# Patient Record
Sex: FEMALE | Race: BLACK OR AFRICAN AMERICAN | Employment: OTHER | URBAN - METROPOLITAN AREA
[De-identification: names, ages, dates, MRNs, and addresses within clinical notes are randomized per-mention and may not be internally consistent; named-entity substitution may affect disease eponyms.]

---

## 2018-07-09 ENCOUNTER — HOSPITAL ENCOUNTER (OUTPATIENT)
Dept: PHYSICAL THERAPY | Age: 73
Setting detail: THERAPIES SERIES
Discharge: HOME | End: 2018-07-09
Payer: MEDICARE

## 2018-07-09 PROCEDURE — G8979 MOBILITY GOAL STATUS: HCPCS

## 2018-07-09 PROCEDURE — 97161 PT EVAL LOW COMPLEX 20 MIN: CPT

## 2018-07-09 PROCEDURE — G8978 MOBILITY CURRENT STATUS: HCPCS

## 2018-07-09 NOTE — PROGRESS NOTES
modified independent level with increased R and L lateral sway but no actual LOB. Decreased heel strike on RLE. Stair Negotiation:  8 steps L rail modified independent level with reciprocal pattern. Additional Comments:  Pt with FAIR endurance with standing/ ambulation. Summary/Assessment:    Pt presents to outpatient physical therapy with decreased endurance, history of multiple falls, and decreased BLE strength which is affecting her functional mobility. Plan:     Below checked are areas for improvement during physical therapy POC:        []  Pain reduction  [x]  Balance Improvement       [x]  Strengthening  []  Postural Improvement   []  Range of Motion  []  Soft Tissue Improvement    [x]  Gait Training   []  Other:      [x]  Home Exercise Program      Pt will be see for 2 visits per week for 4 weeks for a total of 8 visits to accomplish goals set below:        Short Term/ Long Term Goals: (4 weeks)      1. Pt will report 0/10 BLE pain during ambulation and daily activities    2. Pt will increase RLE strength to at least 4+/5 throughout hip/ knee and LLE to 5/5 throughout     3. Pt will improve endurance from FAIR to GOOD    4. Pt will ambulate >350' with w/w as needed at modified independent level with minimal lateral sway and improved heel strike on R    5. Pt will ascend/ descend 12 steps with L rail at modified independent level with reciprocal gait pattern     6. Pt will be independent with HEP. Pt's potential for reaching Physical Therapy goals: good. Time In:  1400  Time Out:  [address] 112, DPT  [id number]    [facility]  T: [phone]   F: [phone]     If you have any questions or concerns, please don't hesitate to call. Thank you for your referral.    Physician Signature:________________________________Date:__________________  By signing above, therapists plan is approved by physician.  All patients under Medicare Insurance   must be signed by physician.

## 2018-07-12 ENCOUNTER — HOSPITAL ENCOUNTER (OUTPATIENT)
Age: 73
Discharge: HOME | End: 2018-07-14
Payer: MEDICARE

## 2018-07-12 ENCOUNTER — OFFICE VISIT (OUTPATIENT)
Dept: NEUROSURGERY | Age: 73
End: 2018-07-12
Payer: MEDICARE

## 2018-07-12 ENCOUNTER — HOSPITAL ENCOUNTER (OUTPATIENT)
Dept: GENERAL RADIOLOGY | Age: 73
Discharge: HOME | End: 2018-07-14
Payer: MEDICARE

## 2018-07-12 VITALS
HEART RATE: 87 BPM | WEIGHT: 231 LBS | DIASTOLIC BLOOD PRESSURE: 62 MMHG | HEIGHT: 67 IN | SYSTOLIC BLOOD PRESSURE: 137 MMHG | BODY MASS INDEX: 36.26 KG/M2

## 2018-07-12 DIAGNOSIS — M54.5 CHRONIC BILATERAL LOW BACK PAIN, WITH SCIATICA PRESENCE UNSPECIFIED: Primary | ICD-10-CM

## 2018-07-12 DIAGNOSIS — M54.5 CHRONIC BILATERAL LOW BACK PAIN, WITH SCIATICA PRESENCE UNSPECIFIED: ICD-10-CM

## 2018-07-12 DIAGNOSIS — G89.29 CHRONIC BILATERAL LOW BACK PAIN, WITH SCIATICA PRESENCE UNSPECIFIED: ICD-10-CM

## 2018-07-12 DIAGNOSIS — R29.898 BILATERAL LEG WEAKNESS: Primary | ICD-10-CM

## 2018-07-12 DIAGNOSIS — G89.29 CHRONIC BILATERAL LOW BACK PAIN, WITH SCIATICA PRESENCE UNSPECIFIED: Primary | ICD-10-CM

## 2018-07-12 PROCEDURE — 1036F TOBACCO NON-USER: CPT | Performed by: NEUROLOGICAL SURGERY

## 2018-07-12 PROCEDURE — 1090F PRES/ABSN URINE INCON ASSESS: CPT | Performed by: NEUROLOGICAL SURGERY

## 2018-07-12 PROCEDURE — 1123F ACP DISCUSS/DSCN MKR DOCD: CPT | Performed by: NEUROLOGICAL SURGERY

## 2018-07-12 PROCEDURE — 99213 OFFICE O/P EST LOW 20 MIN: CPT | Performed by: NEUROLOGICAL SURGERY

## 2018-07-12 PROCEDURE — 72100 X-RAY EXAM L-S SPINE 2/3 VWS: CPT

## 2018-07-12 PROCEDURE — 1101F PT FALLS ASSESS-DOCD LE1/YR: CPT | Performed by: NEUROLOGICAL SURGERY

## 2018-07-12 PROCEDURE — G8417 CALC BMI ABV UP PARAM F/U: HCPCS | Performed by: NEUROLOGICAL SURGERY

## 2018-07-12 PROCEDURE — G8400 PT W/DXA NO RESULTS DOC: HCPCS | Performed by: NEUROLOGICAL SURGERY

## 2018-07-12 PROCEDURE — G8427 DOCREV CUR MEDS BY ELIG CLIN: HCPCS | Performed by: NEUROLOGICAL SURGERY

## 2018-07-12 PROCEDURE — 4040F PNEUMOC VAC/ADMIN/RCVD: CPT | Performed by: NEUROLOGICAL SURGERY

## 2018-07-12 PROCEDURE — 3017F COLORECTAL CA SCREEN DOC REV: CPT | Performed by: NEUROLOGICAL SURGERY

## 2018-07-12 RX ORDER — TIZANIDINE 2 MG/1
4 TABLET ORAL EVERY 8 HOURS PRN
Qty: 30 TABLET | Refills: 1 | Status: SHIPPED | OUTPATIENT
Start: 2018-07-12 | End: 2019-02-21 | Stop reason: SDUPTHER

## 2018-07-12 NOTE — PROGRESS NOTES
Post Operative Follow-up    Patient is status post: thoracolumbar fusion. She is stable. She still has bilateral leg weakness. She has some spasms. Physical Exam  Alert and Oriented X 3  PERRLA, EOMI  4-/5 in bilateral LE, 5/5 in UE  Wound: C/D/I    A/P: patient is s/p T-L fusion 3 years ago. She still has leg weakness. Recommend PT. Follow up in 6 months and we will try zanaflex.     Denis Doherty

## 2018-07-18 ENCOUNTER — HOSPITAL ENCOUNTER (OUTPATIENT)
Dept: PHYSICAL THERAPY | Age: 73
Setting detail: THERAPIES SERIES
Discharge: HOME | End: 2018-07-18
Payer: MEDICARE

## 2018-07-18 PROCEDURE — 97530 THERAPEUTIC ACTIVITIES: CPT

## 2018-07-18 PROCEDURE — 97110 THERAPEUTIC EXERCISES: CPT

## 2018-07-18 NOTE — PROGRESS NOTES
pending MD visit [] Discharge    Plan for Next Session:    Progress RLE strengthening, endurance exercises, and gait training as tolerated.       See Weekly Progress Note: []  Yes  [x]  No  Next due:        Time In: 1300  Time Out: 6177    Electronically signed by:    Gomez Sethi DPT  [id number]

## 2018-07-20 ENCOUNTER — HOSPITAL ENCOUNTER (OUTPATIENT)
Dept: PHYSICAL THERAPY | Age: 73
Setting detail: THERAPIES SERIES
Discharge: HOME | End: 2018-07-20
Payer: MEDICARE

## 2018-07-20 PROCEDURE — 97530 THERAPEUTIC ACTIVITIES: CPT

## 2018-07-20 PROCEDURE — 97110 THERAPEUTIC EXERCISES: CPT

## 2018-07-25 ENCOUNTER — HOSPITAL ENCOUNTER (OUTPATIENT)
Dept: PHYSICAL THERAPY | Age: 73
Setting detail: THERAPIES SERIES
Discharge: HOME | End: 2018-07-25
Payer: MEDICARE

## 2018-07-25 PROCEDURE — 97110 THERAPEUTIC EXERCISES: CPT

## 2018-07-25 PROCEDURE — 97530 THERAPEUTIC ACTIVITIES: CPT

## 2018-07-25 NOTE — PROGRESS NOTES
[facility]                Phone: [phone]   Fax: [phone]    Physical Therapy Daily Treatment Note  Date:  2018    Patient Name:  Tony Monk    :  1945  MRN: [id number]    Referring Physician:  Sharmila Singleton MD   Insurance Information:  Medicare Part A and B     Evaluation date:  18  Diagnosis:  Gait training, falls  Treatment diagnosis:    Cert Dates:  9/5/10- 10/9/18  ICD-10 Codes:  I63.6  R26.9  M62.838  R29.90  Evaluating Physical Therapist:  Luci Leyden, DPT  Plan of care signed (Y/N):    Visit# / total visits:  3/8       Subjective:   Pt with no complaints at this time. She reports doing well since last visit. Denies muscle soreness/ pain following last session. Exercises:   Exercise/Equipment Reps  During/ after  Other comments   NT Bike        Step one  8 min  Level #3     Standing marches  3x15 In // bars  Increased reps today     Sit to stands  3x15 Raised mat table      Standing Hip 3 way    2x15 RLE  Moderate fatigue in BLE after exercise     Supine SLR  3x8 BLE         Step ups  3x12  4\" step  Up with RLE first                   Ambulation   150' with front swivel w/w both modified independent level  R AFO    NT Stair training           Home Exercise Program:       Comments:  Pt tolerated session well with no complaints of pain. She did report moderate BLE fatigue following session. Improved performance with Supine SLR today.       Treatment/Activity Tolerance:  [x] Patient tolerated treatment well [] Patient limited by fatique  [] Patient limited by pain  [] Patient limited by other medical complications  [] Other:     Prognosis: [x] Good [x] Fair  [] Poor    Patient Requires Follow-up: [x] Yes  [] No    Plan:   [x] Continue per plan of care [] Alter current plan (see comments)  [] Plan of care initiated [] Hold pending MD visit [] Discharge    Plan for Next Session:    Progress RLE strengthening, endurance

## 2018-07-27 ENCOUNTER — HOSPITAL ENCOUNTER (OUTPATIENT)
Dept: PHYSICAL THERAPY | Age: 73
Setting detail: THERAPIES SERIES
Discharge: HOME | End: 2018-07-27
Payer: MEDICARE

## 2018-07-27 NOTE — PROGRESS NOTES
[address] [facility]                Phone: [phone]  Fax: [phone]    Physical Therapy  Cancellation/No-show Note  Patient Name:  Sailaja Howard  :  1945   Date:  2018    For today's appointment patient:  [x]  Cancelled  []  Rescheduled appointment  []  No-show     Reason given by patient:  []  Patient ill  [x]  Conflicting appointment  []  No transportation    []  Conflict with work  []  No reason given  [x]  Other: Pt rescheduled for next week.            Electronically signed by:    Jaylin Bo DPT  [id number]

## 2018-08-01 ENCOUNTER — HOSPITAL ENCOUNTER (OUTPATIENT)
Dept: PHYSICAL THERAPY | Age: 73
Setting detail: THERAPIES SERIES
Discharge: HOME | End: 2018-08-01
Payer: MEDICARE

## 2018-09-20 ENCOUNTER — HOSPITAL ENCOUNTER (OUTPATIENT)
Dept: PHYSICAL THERAPY | Age: 73
Setting detail: THERAPIES SERIES
Discharge: HOME | End: 2018-09-20
Payer: MEDICARE

## 2018-09-20 PROCEDURE — G8979 MOBILITY GOAL STATUS: HCPCS

## 2018-09-20 PROCEDURE — G8980 MOBILITY D/C STATUS: HCPCS

## 2018-10-16 ENCOUNTER — APPOINTMENT (OUTPATIENT)
Dept: CT IMAGING | Age: 73
DRG: 556 | End: 2018-10-16
Payer: MEDICARE

## 2018-10-16 ENCOUNTER — HOSPITAL ENCOUNTER (INPATIENT)
Age: 73
LOS: 5 days | Discharge: NURSING, REHAB OR LONG-TERM CARE | DRG: 556 | End: 2018-10-24
Attending: EMERGENCY MEDICINE | Admitting: INTERNAL MEDICINE
Payer: MEDICARE

## 2018-10-16 DIAGNOSIS — R29.898 WEAKNESS OF BOTH LOWER EXTREMITIES: Primary | ICD-10-CM

## 2018-10-16 LAB
ALBUMIN SERPL-MCNC: 4 G/DL (ref 3.5–5.2)
ALP BLD-CCNC: 68 U/L (ref 35–104)
ALT SERPL-CCNC: 15 U/L (ref 0–32)
ANION GAP SERPL CALCULATED.3IONS-SCNC: 13 MMOL/L (ref 7–16)
AST SERPL-CCNC: 26 U/L (ref 0–31)
BASOPHILS ABSOLUTE: 0.02 E9/L (ref 0–0.2)
BASOPHILS RELATIVE PERCENT: 0.3 % (ref 0–2)
BILIRUB SERPL-MCNC: 0.4 MG/DL (ref 0–1.2)
BUN BLDV-MCNC: 28 MG/DL (ref 8–23)
CALCIUM SERPL-MCNC: 9.2 MG/DL (ref 8.6–10.2)
CHLORIDE BLD-SCNC: 104 MMOL/L (ref 98–107)
CO2: 25 MMOL/L (ref 22–29)
CREAT SERPL-MCNC: 0.8 MG/DL (ref 0.5–1)
EOSINOPHILS ABSOLUTE: 0.06 E9/L (ref 0.05–0.5)
EOSINOPHILS RELATIVE PERCENT: 0.9 % (ref 0–6)
GFR AFRICAN AMERICAN: >60
GFR NON-AFRICAN AMERICAN: >60 ML/MIN/1.73
GLUCOSE BLD-MCNC: 94 MG/DL (ref 74–109)
HCT VFR BLD CALC: 39.5 % (ref 34–48)
HEMOGLOBIN: 12.6 G/DL (ref 11.5–15.5)
IMMATURE GRANULOCYTES #: 0.03 E9/L
IMMATURE GRANULOCYTES %: 0.4 % (ref 0–5)
LYMPHOCYTES ABSOLUTE: 1.75 E9/L (ref 1.5–4)
LYMPHOCYTES RELATIVE PERCENT: 25.7 % (ref 20–42)
MCH RBC QN AUTO: 28 PG (ref 26–35)
MCHC RBC AUTO-ENTMCNC: 31.9 % (ref 32–34.5)
MCV RBC AUTO: 87.8 FL (ref 80–99.9)
MONOCYTES ABSOLUTE: 0.43 E9/L (ref 0.1–0.95)
MONOCYTES RELATIVE PERCENT: 6.3 % (ref 2–12)
NEUTROPHILS ABSOLUTE: 4.52 E9/L (ref 1.8–7.3)
NEUTROPHILS RELATIVE PERCENT: 66.4 % (ref 43–80)
PDW BLD-RTO: 15.5 FL (ref 11.5–15)
PLATELET # BLD: 345 E9/L (ref 130–450)
PMV BLD AUTO: 12.8 FL (ref 7–12)
POTASSIUM SERPL-SCNC: 3.4 MMOL/L (ref 3.5–5)
RBC # BLD: 4.5 E12/L (ref 3.5–5.5)
SODIUM BLD-SCNC: 142 MMOL/L (ref 132–146)
TOTAL PROTEIN: 6.8 G/DL (ref 6.4–8.3)
WBC # BLD: 6.8 E9/L (ref 4.5–11.5)

## 2018-10-16 PROCEDURE — G0378 HOSPITAL OBSERVATION PER HR: HCPCS

## 2018-10-16 PROCEDURE — 36415 COLL VENOUS BLD VENIPUNCTURE: CPT

## 2018-10-16 PROCEDURE — 72131 CT LUMBAR SPINE W/O DYE: CPT

## 2018-10-16 PROCEDURE — 96372 THER/PROPH/DIAG INJ SC/IM: CPT

## 2018-10-16 PROCEDURE — 85025 COMPLETE CBC W/AUTO DIFF WBC: CPT

## 2018-10-16 PROCEDURE — 80053 COMPREHEN METABOLIC PANEL: CPT

## 2018-10-16 PROCEDURE — 2580000003 HC RX 258: Performed by: INTERNAL MEDICINE

## 2018-10-16 PROCEDURE — 99285 EMERGENCY DEPT VISIT HI MDM: CPT

## 2018-10-16 RX ORDER — POTASSIUM CHLORIDE 7.45 MG/ML
10 INJECTION INTRAVENOUS PRN
Status: DISCONTINUED | OUTPATIENT
Start: 2018-10-16 | End: 2018-10-24 | Stop reason: HOSPADM

## 2018-10-16 RX ORDER — HYDROCHLOROTHIAZIDE 25 MG/1
25 TABLET ORAL DAILY
Status: DISCONTINUED | OUTPATIENT
Start: 2018-10-17 | End: 2018-10-24 | Stop reason: HOSPADM

## 2018-10-16 RX ORDER — LISINOPRIL AND HYDROCHLOROTHIAZIDE 25; 20 MG/1; MG/1
1 TABLET ORAL DAILY
Status: DISCONTINUED | OUTPATIENT
Start: 2018-10-17 | End: 2018-10-16 | Stop reason: CLARIF

## 2018-10-16 RX ORDER — ONDANSETRON 2 MG/ML
4 INJECTION INTRAMUSCULAR; INTRAVENOUS EVERY 6 HOURS PRN
Status: DISCONTINUED | OUTPATIENT
Start: 2018-10-16 | End: 2018-10-24 | Stop reason: HOSPADM

## 2018-10-16 RX ORDER — DOCUSATE SODIUM 100 MG/1
200 CAPSULE, LIQUID FILLED ORAL 2 TIMES DAILY
Status: DISCONTINUED | OUTPATIENT
Start: 2018-10-16 | End: 2018-10-24 | Stop reason: HOSPADM

## 2018-10-16 RX ORDER — ALLOPURINOL 300 MG/1
300 TABLET ORAL DAILY
Status: DISCONTINUED | OUTPATIENT
Start: 2018-10-17 | End: 2018-10-24 | Stop reason: HOSPADM

## 2018-10-16 RX ORDER — ASPIRIN 81 MG/1
81 TABLET, CHEWABLE ORAL DAILY
Status: DISCONTINUED | OUTPATIENT
Start: 2018-10-17 | End: 2018-10-22

## 2018-10-16 RX ORDER — POTASSIUM CHLORIDE 20 MEQ/1
40 TABLET, EXTENDED RELEASE ORAL PRN
Status: DISCONTINUED | OUTPATIENT
Start: 2018-10-16 | End: 2018-10-24 | Stop reason: HOSPADM

## 2018-10-16 RX ORDER — SODIUM CHLORIDE 0.9 % (FLUSH) 0.9 %
10 SYRINGE (ML) INJECTION EVERY 12 HOURS SCHEDULED
Status: DISCONTINUED | OUTPATIENT
Start: 2018-10-16 | End: 2018-10-24 | Stop reason: HOSPADM

## 2018-10-16 RX ORDER — SODIUM CHLORIDE 0.9 % (FLUSH) 0.9 %
10 SYRINGE (ML) INJECTION PRN
Status: DISCONTINUED | OUTPATIENT
Start: 2018-10-16 | End: 2018-10-24 | Stop reason: HOSPADM

## 2018-10-16 RX ORDER — CITALOPRAM 20 MG/1
10 TABLET ORAL DAILY
Status: DISCONTINUED | OUTPATIENT
Start: 2018-10-17 | End: 2018-10-24 | Stop reason: HOSPADM

## 2018-10-16 RX ORDER — TIZANIDINE 4 MG/1
4 TABLET ORAL EVERY 8 HOURS PRN
Status: DISCONTINUED | OUTPATIENT
Start: 2018-10-16 | End: 2018-10-24 | Stop reason: HOSPADM

## 2018-10-16 RX ORDER — BACLOFEN 10 MG/1
5 TABLET ORAL NIGHTLY
Status: DISCONTINUED | OUTPATIENT
Start: 2018-10-16 | End: 2018-10-24 | Stop reason: HOSPADM

## 2018-10-16 RX ORDER — OMEPRAZOLE 20 MG/1
1 CAPSULE, DELAYED RELEASE ORAL DAILY
Refills: 0 | COMMUNITY
Start: 2018-08-03

## 2018-10-16 RX ORDER — LISINOPRIL 20 MG/1
20 TABLET ORAL DAILY
Status: DISCONTINUED | OUTPATIENT
Start: 2018-10-17 | End: 2018-10-24 | Stop reason: HOSPADM

## 2018-10-16 RX ORDER — POTASSIUM CHLORIDE 20MEQ/15ML
40 LIQUID (ML) ORAL PRN
Status: DISCONTINUED | OUTPATIENT
Start: 2018-10-16 | End: 2018-10-24 | Stop reason: HOSPADM

## 2018-10-16 RX ORDER — LISINOPRIL AND HYDROCHLOROTHIAZIDE 25; 20 MG/1; MG/1
1 TABLET ORAL DAILY
COMMUNITY
Start: 2018-09-17

## 2018-10-16 RX ADMIN — Medication 10 ML: at 23:02

## 2018-10-16 ASSESSMENT — PAIN SCALES - GENERAL: PAINLEVEL_OUTOF10: 0

## 2018-10-16 NOTE — ED PROVIDER NOTES
26.0 - 35.0 pg    MCHC 31.9 (L) 32.0 - 34.5 %    RDW 15.5 (H) 11.5 - 15.0 fL    Platelets 907 070 - 667 E9/L    MPV 12.8 (H) 7.0 - 12.0 fL    Neutrophils % 66.4 43.0 - 80.0 %    Immature Granulocytes % 0.4 0.0 - 5.0 %    Lymphocytes % 25.7 20.0 - 42.0 %    Monocytes % 6.3 2.0 - 12.0 %    Eosinophils % 0.9 0.0 - 6.0 %    Basophils % 0.3 0.0 - 2.0 %    Neutrophils # 4.52 1.80 - 7.30 E9/L    Immature Granulocytes # 0.03 E9/L    Lymphocytes # 1.75 1.50 - 4.00 E9/L    Monocytes # 0.43 0.10 - 0.95 E9/L    Eosinophils # 0.06 0.05 - 0.50 E9/L    Basophils # 0.02 0.00 - 0.20 E9/L   Comprehensive Metabolic Panel   Result Value Ref Range    Sodium 142 132 - 146 mmol/L    Potassium 3.4 (L) 3.5 - 5.0 mmol/L    Chloride 104 98 - 107 mmol/L    CO2 25 22 - 29 mmol/L    Anion Gap 13 7 - 16 mmol/L    Glucose 94 74 - 109 mg/dL    BUN 28 (H) 8 - 23 mg/dL    CREATININE 0.8 0.5 - 1.0 mg/dL    GFR Non-African American >60 >=60 mL/min/1.73    GFR African American >60     Calcium 9.2 8.6 - 10.2 mg/dL    Total Protein 6.8 6.4 - 8.3 g/dL    Alb 4.0 3.5 - 5.2 g/dL    Total Bilirubin 0.4 0.0 - 1.2 mg/dL    Alkaline Phosphatase 68 35 - 104 U/L    ALT 15 0 - 32 U/L    AST 26 0 - 31 U/L       RADIOLOGY:  Interpreted by Radiologist.  CT Lumbar Spine WO Contrast   Final Result   1. Extensive surgery and fusion of the lumbar spine as commented. 2. Considering de difficult to evaluate the fine detail due to the   streak artifacts of the orthopedic hardware cannot conspicuously   identified a new acute fracture or dislocation in the lower   thoracic/lumbar/upper sacral spine. 3. Old misalignments at L4-5 and L5-S1 stabilized by diffusion.                      ------------------------- NURSING NOTES AND VITALS REVIEWED ---------------------------   The nursing notes within the ED encounter and vital signs as below have been reviewed by myself.   /70   Pulse 91   Temp 97.8 °F (36.6 °C)   Resp 18   Ht 5' 7\" (1.702 m)   Wt 230 lb

## 2018-10-17 LAB
AMORPHOUS: ABNORMAL
BACTERIA: ABNORMAL /HPF
BASOPHILS ABSOLUTE: 0.02 E9/L (ref 0–0.2)
BASOPHILS RELATIVE PERCENT: 0.3 % (ref 0–2)
BILIRUBIN URINE: NEGATIVE
BLOOD, URINE: NEGATIVE
C-REACTIVE PROTEIN: 0.8 MG/DL (ref 0–0.4)
CLARITY: NORMAL
COLOR: YELLOW
EOSINOPHILS ABSOLUTE: 0.13 E9/L (ref 0.05–0.5)
EOSINOPHILS RELATIVE PERCENT: 2 % (ref 0–6)
EPITHELIAL CELLS, UA: ABNORMAL /HPF
GLUCOSE URINE: NEGATIVE MG/DL
HCT VFR BLD CALC: 37.6 % (ref 34–48)
HEMOGLOBIN: 11.6 G/DL (ref 11.5–15.5)
IMMATURE GRANULOCYTES #: 0.02 E9/L
IMMATURE GRANULOCYTES %: 0.3 % (ref 0–5)
KETONES, URINE: NEGATIVE MG/DL
LEUKOCYTE ESTERASE, URINE: NEGATIVE
LYMPHOCYTES ABSOLUTE: 2.38 E9/L (ref 1.5–4)
LYMPHOCYTES RELATIVE PERCENT: 36.5 % (ref 20–42)
MCH RBC QN AUTO: 27.3 PG (ref 26–35)
MCHC RBC AUTO-ENTMCNC: 30.9 % (ref 32–34.5)
MCV RBC AUTO: 88.5 FL (ref 80–99.9)
MONOCYTES ABSOLUTE: 0.53 E9/L (ref 0.1–0.95)
MONOCYTES RELATIVE PERCENT: 8.1 % (ref 2–12)
NEUTROPHILS ABSOLUTE: 3.44 E9/L (ref 1.8–7.3)
NEUTROPHILS RELATIVE PERCENT: 52.8 % (ref 43–80)
NITRITE, URINE: NEGATIVE
PDW BLD-RTO: 15.1 FL (ref 11.5–15)
PH UA: 7 (ref 5–9)
PLATELET # BLD: 265 E9/L (ref 130–450)
PMV BLD AUTO: 11.5 FL (ref 7–12)
PROTEIN UA: NEGATIVE MG/DL
RBC # BLD: 4.25 E12/L (ref 3.5–5.5)
RBC UA: ABNORMAL /HPF (ref 0–2)
SEDIMENTATION RATE, ERYTHROCYTE: 12 MM/HR (ref 0–20)
SPECIFIC GRAVITY UA: 1.01 (ref 1–1.03)
TOTAL CK: 317 U/L (ref 20–180)
UROBILINOGEN, URINE: 1 E.U./DL
WBC # BLD: 6.5 E9/L (ref 4.5–11.5)
WBC UA: ABNORMAL /HPF (ref 0–5)

## 2018-10-17 PROCEDURE — G8979 MOBILITY GOAL STATUS: HCPCS

## 2018-10-17 PROCEDURE — 6370000000 HC RX 637 (ALT 250 FOR IP): Performed by: INTERNAL MEDICINE

## 2018-10-17 PROCEDURE — 97161 PT EVAL LOW COMPLEX 20 MIN: CPT

## 2018-10-17 PROCEDURE — 81001 URINALYSIS AUTO W/SCOPE: CPT

## 2018-10-17 PROCEDURE — 97530 THERAPEUTIC ACTIVITIES: CPT

## 2018-10-17 PROCEDURE — 36415 COLL VENOUS BLD VENIPUNCTURE: CPT

## 2018-10-17 PROCEDURE — 2580000003 HC RX 258: Performed by: INTERNAL MEDICINE

## 2018-10-17 PROCEDURE — 86140 C-REACTIVE PROTEIN: CPT

## 2018-10-17 PROCEDURE — 6360000002 HC RX W HCPCS: Performed by: INTERNAL MEDICINE

## 2018-10-17 PROCEDURE — G8987 SELF CARE CURRENT STATUS: HCPCS

## 2018-10-17 PROCEDURE — G8978 MOBILITY CURRENT STATUS: HCPCS

## 2018-10-17 PROCEDURE — G8988 SELF CARE GOAL STATUS: HCPCS

## 2018-10-17 PROCEDURE — G0378 HOSPITAL OBSERVATION PER HR: HCPCS

## 2018-10-17 PROCEDURE — 97165 OT EVAL LOW COMPLEX 30 MIN: CPT

## 2018-10-17 PROCEDURE — 99222 1ST HOSP IP/OBS MODERATE 55: CPT | Performed by: NEUROLOGICAL SURGERY

## 2018-10-17 PROCEDURE — 85025 COMPLETE CBC W/AUTO DIFF WBC: CPT

## 2018-10-17 PROCEDURE — 96372 THER/PROPH/DIAG INJ SC/IM: CPT

## 2018-10-17 PROCEDURE — 82550 ASSAY OF CK (CPK): CPT

## 2018-10-17 PROCEDURE — 85651 RBC SED RATE NONAUTOMATED: CPT

## 2018-10-17 RX ADMIN — DOCUSATE SODIUM 200 MG: 100 CAPSULE, LIQUID FILLED ORAL at 20:01

## 2018-10-17 RX ADMIN — Medication 10 ML: at 10:50

## 2018-10-17 RX ADMIN — MAGNESIUM HYDROXIDE 30 ML: 400 SUSPENSION ORAL at 16:44

## 2018-10-17 RX ADMIN — ENOXAPARIN SODIUM 40 MG: 40 INJECTION SUBCUTANEOUS at 10:51

## 2018-10-17 RX ADMIN — CITALOPRAM 10 MG: 20 TABLET, FILM COATED ORAL at 10:49

## 2018-10-17 RX ADMIN — Medication 10 ML: at 20:01

## 2018-10-17 RX ADMIN — DOCUSATE SODIUM 200 MG: 100 CAPSULE, LIQUID FILLED ORAL at 10:51

## 2018-10-17 RX ADMIN — ASPIRIN 81 MG CHEWABLE TABLET 81 MG: 81 TABLET CHEWABLE at 10:49

## 2018-10-17 RX ADMIN — POTASSIUM CHLORIDE 40 MEQ: 20 TABLET, EXTENDED RELEASE ORAL at 10:48

## 2018-10-17 RX ADMIN — LISINOPRIL 20 MG: 20 TABLET ORAL at 10:50

## 2018-10-17 RX ADMIN — BACLOFEN 5 MG: 10 TABLET ORAL at 20:00

## 2018-10-17 RX ADMIN — HYDROCHLOROTHIAZIDE 25 MG: 25 TABLET ORAL at 10:49

## 2018-10-17 RX ADMIN — ALLOPURINOL 300 MG: 300 TABLET ORAL at 10:50

## 2018-10-17 ASSESSMENT — PAIN DESCRIPTION - PAIN TYPE: TYPE: CHRONIC PAIN

## 2018-10-17 ASSESSMENT — PAIN DESCRIPTION - FREQUENCY: FREQUENCY: INTERMITTENT

## 2018-10-17 ASSESSMENT — PAIN SCALES - GENERAL
PAINLEVEL_OUTOF10: 4
PAINLEVEL_OUTOF10: 0

## 2018-10-17 ASSESSMENT — PAIN DESCRIPTION - LOCATION: LOCATION: SHOULDER

## 2018-10-17 ASSESSMENT — PAIN DESCRIPTION - ORIENTATION: ORIENTATION: RIGHT

## 2018-10-17 ASSESSMENT — PAIN DESCRIPTION - DESCRIPTORS: DESCRIPTORS: DISCOMFORT;ACHING;TENDER

## 2018-10-17 ASSESSMENT — PAIN DESCRIPTION - ONSET: ONSET: PROGRESSIVE

## 2018-10-17 NOTE — PLAN OF CARE
Problem: Falls - Risk of:  Goal: Absence of physical injury  Absence of physical injury   Outcome: Met This Shift      Problem: Pain:  Goal: Control of acute pain  Control of acute pain   Outcome: Ongoing
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
## 2018-10-17 NOTE — H&P
Department of Internal Medicine  General Internal Medicine  Simeon Johnson M.D. History and Physical      CHIEF COMPLAINT:  Weakness in legs b/ l     Reason for Admission:  As above /possible placement     History Obtained From:  patient     HISTORY OF PRESENT ILLNESS:      The patient is a 72 y.o. female of Araceli Wolf MD with significant past medical history of back surgery remotely  who presents with complaints of lower extremity weakness. sHe tells me she went to the bathroom and wanted to squat down to do a bit of activity. She was unable to get back up. She scooted herself to  An area where she was able to lift herself up using handrails. She felt ok and was able to ambulate using her walker. Next morning , she was again so weak that she was not able to get  Out of bed. No bowel bladder incontinence, no alarm symptoms.  ,      Past Medical History:        Diagnosis Date    Anterolisthesis     Back pain 1/11/2013    Bulging lumbar disc     DDD (degenerative disc disease)     Facet arthropathy, lumbar     Herniated lumbar intervertebral disc     History of blood transfusion     Hypertension     Leg pain, bilateral     Neural foraminal stenosis of lumbar spine     Osteoarthritis     Pinched vertebral nerve     Sciatic pain     Spinal stenosis of lumbar region      Past Surgical History:        Procedure Laterality Date    BACK SURGERY      BREAST SURGERY      COLONOSCOPY      LUMBAR FUSION      LUMBAR LAMINECTOMY  04/29/2013    and fusion lumbar1-lumbar4    SPINE SURGERY      x 3    THORACIC LAMINECTOMY  04/29/2015    T9-12    TONSILLECTOMY           Medications Prior to Admission:    Prescriptions Prior to Admission: lisinopril-hydrochlorothiazide (PRINZIDE;ZESTORETIC) 20-25 MG per tablet, Take 1 tablet by mouth daily  omeprazole (PRILOSEC) 20 MG delayed release capsule, Take 1 capsule by mouth daily  tiZANidine (ZANAFLEX) 2 MG tablet, Take 2 tablets by mouth every 8 hours as needed (muscle spasms)  baclofen (LIORESAL) 10 MG tablet, Take 0.5 tablets by mouth nightly (Patient taking differently: Take 5 mg by mouth nightly as needed )  citalopram (CELEXA) 10 MG tablet, Take 1 tablet by mouth daily  Cyanocobalamin (VITAMIN B-12) 50 MCG tablet, Take 1 tablet by mouth daily  docusate sodium (COLACE, DULCOLAX) 100 MG CAPS, Take 200 mg by mouth 2 times daily (Patient taking differently: Take 200 mg by mouth 2 times daily as needed )  aspirin 81 MG chewable tablet, Take 81 mg by mouth daily  allopurinol (ZYLOPRIM) 300 MG tablet, Take 300 mg by mouth daily. Multiple Vitamins-Minerals (CENTRUM SILVER) TABS, Take 1 tablet by mouth daily. Allergies:  Patient has no known allergies. Social History:   TOBACCO:   reports that she has never smoked. She has never used smokeless tobacco.  ETOH:   reports that she does not drink alcohol. MARITAL STATUS:    OCCUPATION:      Family History:   Family History   Problem Relation Age of Onset    Diabetes Mother     Diabetes Sister    Bill Perks Glaucoma Sister     Hypertension Brother     Glaucoma Brother        REVIEW OF SYSTEMS:    General ROS: weakness in legs   Hematological and Lymphatic ROS: negative  Endocrine ROS: negative  Respiratory ROS: no cough, shortness of breath, or wheezing  Cardiovascular ROS: no chest pain or dyspnea on exertion  Gastrointestinal ROS: no abdominal pain, change in bowel habits, or black or bloody stools  Genito-Urinary ROS: no dysuria, trouble voiding, or hematuria  Neurological ROS: no TIA or stroke symptoms  negative    Vitals:  BP (!) 170/70   Pulse 76   Temp 97.6 °F (36.4 °C) (Temporal)   Resp 16   Ht 5' 7\" (1.702 m)   Wt 230 lb (104.3 kg)   SpO2 97%   BMI 36.02 kg/m²     PHYSICAL EXAM:  General:  Awake, alert, oriented X 3. Well developed, well nourished, well groomed. No apparent distress. HEENT:  Normocephalic, atraumatic. Pupils equal, round, reactive to light. No scleral icterus. No conjunctival injection.   Normal lips, teeth, and gums. No nasal discharge. Neck:  Supple  Heart:  RRR, no murmurs, gallops, rubs, carotid upstroke normal, no carotid bruits  Lungs:  CTA bilaterally, bilat symmetrical expansion, no wheeze, rales, or rhonchi  Abdomen: Bowel sounds present, soft, nontender, no masses, no organomegaly, no peritoneal signs  Extremities:  Right shoulder weakness, b/l lower extremity dorsiflexion diminished   Skin:  Warm and dry, no open lesions or rash  Neuro:  Cranial nerves 2-12 intact, no focal deficits  Breast: deferred  Rectal: deferred  Genitalia:  deferred      DATA:     Recent Labs      10/16/18   1815  10/17/18   0448   WBC  6.8  6.5   HGB  12.6  11.6   PLT  345  265     Recent Labs      10/16/18   1706   NA  142   K  3.4*   BUN  28*   CREATININE  0.8     Recent Labs      10/16/18   1706   PROT  6.8     Recent Labs      10/16/18   1706   AST  26   ALT  15   ALKPHOS  68   BILITOT  0.4     No results for input(s): BNP in the last 72 hours. No results for input(s): CKTOTAL, CKMB, CKMBINDEX, TROPONINI in the last 72 hours. ASSESSMENT:      Principal Problem:    Weakness of both lower extremities  Resolved Problems:    * No resolved hospital problems.  *        PLAN:    Admitted to [facility] evaluation of b/l lower extremtity weakness sudden onset  Trial steroids , check CK total , check ESR /CRP   Aggressive PT/Ot if able   She would like to go to Acute rehab - not sure if she will be accepted   Right shoulder xray     DVT proph  Pt/OT  Dc plan    Rohan Garcia MD  10/17/2018  12:02 PM

## 2018-10-18 ENCOUNTER — APPOINTMENT (OUTPATIENT)
Dept: GENERAL RADIOLOGY | Age: 73
DRG: 556 | End: 2018-10-18
Payer: MEDICARE

## 2018-10-18 LAB
ANION GAP SERPL CALCULATED.3IONS-SCNC: 14 MMOL/L (ref 7–16)
BASOPHILS ABSOLUTE: 0.03 E9/L (ref 0–0.2)
BASOPHILS RELATIVE PERCENT: 0.5 % (ref 0–2)
BUN BLDV-MCNC: 19 MG/DL (ref 8–23)
CALCIUM SERPL-MCNC: 9.2 MG/DL (ref 8.6–10.2)
CHLORIDE BLD-SCNC: 104 MMOL/L (ref 98–107)
CO2: 24 MMOL/L (ref 22–29)
CREAT SERPL-MCNC: 0.7 MG/DL (ref 0.5–1)
EOSINOPHILS ABSOLUTE: 0.12 E9/L (ref 0.05–0.5)
EOSINOPHILS RELATIVE PERCENT: 1.9 % (ref 0–6)
GFR AFRICAN AMERICAN: >60
GFR NON-AFRICAN AMERICAN: >60 ML/MIN/1.73
GLUCOSE BLD-MCNC: 102 MG/DL (ref 74–109)
HCT VFR BLD CALC: 36.7 % (ref 34–48)
HEMOGLOBIN: 11.4 G/DL (ref 11.5–15.5)
IMMATURE GRANULOCYTES #: 0.03 E9/L
IMMATURE GRANULOCYTES %: 0.5 % (ref 0–5)
LYMPHOCYTES ABSOLUTE: 2.32 E9/L (ref 1.5–4)
LYMPHOCYTES RELATIVE PERCENT: 36.5 % (ref 20–42)
MCH RBC QN AUTO: 27.4 PG (ref 26–35)
MCHC RBC AUTO-ENTMCNC: 31.1 % (ref 32–34.5)
MCV RBC AUTO: 88.2 FL (ref 80–99.9)
MONOCYTES ABSOLUTE: 0.5 E9/L (ref 0.1–0.95)
MONOCYTES RELATIVE PERCENT: 7.9 % (ref 2–12)
NEUTROPHILS ABSOLUTE: 3.36 E9/L (ref 1.8–7.3)
NEUTROPHILS RELATIVE PERCENT: 52.7 % (ref 43–80)
PDW BLD-RTO: 15.3 FL (ref 11.5–15)
PLATELET # BLD: 283 E9/L (ref 130–450)
PMV BLD AUTO: 12.1 FL (ref 7–12)
POTASSIUM SERPL-SCNC: 4.4 MMOL/L (ref 3.5–5)
RBC # BLD: 4.16 E12/L (ref 3.5–5.5)
SODIUM BLD-SCNC: 142 MMOL/L (ref 132–146)
WBC # BLD: 6.4 E9/L (ref 4.5–11.5)

## 2018-10-18 PROCEDURE — G0378 HOSPITAL OBSERVATION PER HR: HCPCS

## 2018-10-18 PROCEDURE — 36415 COLL VENOUS BLD VENIPUNCTURE: CPT

## 2018-10-18 PROCEDURE — 73030 X-RAY EXAM OF SHOULDER: CPT

## 2018-10-18 PROCEDURE — 99221 1ST HOSP IP/OBS SF/LOW 40: CPT | Performed by: PSYCHIATRY & NEUROLOGY

## 2018-10-18 PROCEDURE — 6360000002 HC RX W HCPCS: Performed by: INTERNAL MEDICINE

## 2018-10-18 PROCEDURE — 85025 COMPLETE CBC W/AUTO DIFF WBC: CPT

## 2018-10-18 PROCEDURE — 96372 THER/PROPH/DIAG INJ SC/IM: CPT

## 2018-10-18 PROCEDURE — 6370000000 HC RX 637 (ALT 250 FOR IP): Performed by: INTERNAL MEDICINE

## 2018-10-18 PROCEDURE — 2580000003 HC RX 258: Performed by: INTERNAL MEDICINE

## 2018-10-18 PROCEDURE — 80048 BASIC METABOLIC PNL TOTAL CA: CPT

## 2018-10-18 RX ORDER — ACETAMINOPHEN 325 MG/1
650 TABLET ORAL EVERY 4 HOURS PRN
Status: DISCONTINUED | OUTPATIENT
Start: 2018-10-18 | End: 2018-10-24 | Stop reason: HOSPADM

## 2018-10-18 RX ORDER — HYDROCODONE BITARTRATE AND ACETAMINOPHEN 5; 325 MG/1; MG/1
1 TABLET ORAL EVERY 6 HOURS PRN
Status: DISCONTINUED | OUTPATIENT
Start: 2018-10-18 | End: 2018-10-24 | Stop reason: HOSPADM

## 2018-10-18 RX ADMIN — HYDROCODONE BITARTRATE AND ACETAMINOPHEN 1 TABLET: 5; 325 TABLET ORAL at 17:39

## 2018-10-18 RX ADMIN — Medication 10 ML: at 10:11

## 2018-10-18 RX ADMIN — HYDROCHLOROTHIAZIDE 25 MG: 25 TABLET ORAL at 10:04

## 2018-10-18 RX ADMIN — ALLOPURINOL 300 MG: 300 TABLET ORAL at 10:02

## 2018-10-18 RX ADMIN — ENOXAPARIN SODIUM 40 MG: 40 INJECTION SUBCUTANEOUS at 09:59

## 2018-10-18 RX ADMIN — TIZANIDINE 4 MG: 4 TABLET ORAL at 00:57

## 2018-10-18 RX ADMIN — BACLOFEN 5 MG: 10 TABLET ORAL at 23:11

## 2018-10-18 RX ADMIN — DOCUSATE SODIUM 200 MG: 100 CAPSULE, LIQUID FILLED ORAL at 09:59

## 2018-10-18 RX ADMIN — DOCUSATE SODIUM 200 MG: 100 CAPSULE, LIQUID FILLED ORAL at 23:12

## 2018-10-18 RX ADMIN — LISINOPRIL 20 MG: 20 TABLET ORAL at 10:04

## 2018-10-18 RX ADMIN — CITALOPRAM 10 MG: 20 TABLET, FILM COATED ORAL at 10:02

## 2018-10-18 RX ADMIN — HYDROCODONE BITARTRATE AND ACETAMINOPHEN 1 TABLET: 5; 325 TABLET ORAL at 23:12

## 2018-10-18 RX ADMIN — ASPIRIN 81 MG CHEWABLE TABLET 81 MG: 81 TABLET CHEWABLE at 10:02

## 2018-10-18 RX ADMIN — Medication 10 ML: at 23:18

## 2018-10-18 ASSESSMENT — PAIN SCALES - GENERAL
PAINLEVEL_OUTOF10: 6
PAINLEVEL_OUTOF10: 0
PAINLEVEL_OUTOF10: 6
PAINLEVEL_OUTOF10: 3
PAINLEVEL_OUTOF10: 0
PAINLEVEL_OUTOF10: 0

## 2018-10-18 NOTE — CONSULTS
nightly  Patient taking differently: Take 5 mg by mouth nightly as needed  11/30/15  Yes Omar Che MD   citalopram (CELEXA) 10 MG tablet Take 1 tablet by mouth daily 6/5/15  Yes Omar Che MD   Cyanocobalamin (VITAMIN B-12) 50 MCG tablet Take 1 tablet by mouth daily 6/5/15  Yes Omar Che MD   docusate sodium (COLACE, DULCOLAX) 100 MG CAPS Take 200 mg by mouth 2 times daily  Patient taking differently: Take 200 mg by mouth 2 times daily as needed  6/5/15  Yes Omar Che MD   aspirin 81 MG chewable tablet Take 81 mg by mouth daily   Yes Historical Provider, MD   allopurinol (ZYLOPRIM) 300 MG tablet Take 300 mg by mouth daily. Yes Historical Provider, MD   Multiple Vitamins-Minerals (CENTRUM SILVER) TABS Take 1 tablet by mouth daily. Yes Historical Provider, MD       Allergies:  Patient has no known allergies. Family History   Problem Relation Age of Onset    Diabetes Mother     Diabetes Sister     Glaucoma Sister     Hypertension Brother     Glaucoma Brother        Social History   Substance Use Topics    Smoking status: Never Smoker    Smokeless tobacco: Never Used    Alcohol use No          ROS:  14 points review of sytem were checked and were neg except as above      EXAMINATION:  BP (!) 161/71   Pulse 80   Temp 98 °F (36.7 °C) (Temporal)   Resp 18   Ht 5' 7\" (1.702 m)   Wt 230 lb (104.3 kg)   SpO2 95%   BMI 36.02 kg/m²     AAOx3, follows commands, no aphasia/dysarthria. PERRL, EOMI, VFF, normal saccades and pursuit, no gaze preference/nystagmus. Intact facial sensation, no asymmetry. Intact hearing bilaterally. Tongue midline. Symmetric palate elevation. Neck muscles and shoulder shrug 5/5. Sensation: intact all over to LT, no neglect. Motor: 5/5 in upper extremities. Normal bulk and tone, No drift/orbiting. In lower extremities there is a component of effort related weakness. It is 1-2/5. The tone is high.   DTRs: +2 biceps/triceps/BR/Knees, 0 ankles, plantars UP B/L. Coordination: intact F-N. No dysmetria. Intact CHRISTEL.      ASSESSMENT:  Subacute weakness in both legs. Plantars are up B/L. PLAN:   Cervical and thoracic and lumbar MRI all without Giorgi. Will follow up with results. -----------------------------------------------------  All questions were answered to the patient's/family's satisfaction when they are available. I personally reviewed the history, exam findings, labs, imagings and other diagnostic tests/conveyed these findings and my assessment of these aforementioned items and also treatment plan, risks/benefits of treatment recommended and prognosis/goals of treatment to patient/family/staff. I spent 55 minutes personally with the patient/family/staff/resident(s) in examination, chart and imaging review, discussing natural history and prognosis, differential diagnosis, risks and benefits of treatment and instructions of which more than 50% of the time was spent for counseling and coordinating care.         Electronically signed by Andre Oppenheim, MD on 10/18/2018 at 1:47 PM

## 2018-10-19 ENCOUNTER — APPOINTMENT (OUTPATIENT)
Dept: MRI IMAGING | Age: 73
DRG: 556 | End: 2018-10-19
Payer: MEDICARE

## 2018-10-19 PROBLEM — R53.81 DEBILITY: Status: ACTIVE | Noted: 2018-10-19

## 2018-10-19 PROCEDURE — 2580000003 HC RX 258: Performed by: INTERNAL MEDICINE

## 2018-10-19 PROCEDURE — 6370000000 HC RX 637 (ALT 250 FOR IP): Performed by: INTERNAL MEDICINE

## 2018-10-19 PROCEDURE — 99232 SBSQ HOSP IP/OBS MODERATE 35: CPT | Performed by: NEUROLOGICAL SURGERY

## 2018-10-19 PROCEDURE — G0378 HOSPITAL OBSERVATION PER HR: HCPCS

## 2018-10-19 PROCEDURE — 6360000002 HC RX W HCPCS: Performed by: INTERNAL MEDICINE

## 2018-10-19 PROCEDURE — 99232 SBSQ HOSP IP/OBS MODERATE 35: CPT | Performed by: NURSE PRACTITIONER

## 2018-10-19 PROCEDURE — 1200000000 HC SEMI PRIVATE

## 2018-10-19 RX ORDER — LORAZEPAM 2 MG/ML
0.5 INJECTION INTRAMUSCULAR
Status: ACTIVE | OUTPATIENT
Start: 2018-10-19 | End: 2018-10-19

## 2018-10-19 RX ORDER — LORAZEPAM 0.5 MG/1
0.5 TABLET ORAL
Status: ACTIVE | OUTPATIENT
Start: 2018-10-19 | End: 2018-10-19

## 2018-10-19 RX ADMIN — Medication 10 ML: at 21:04

## 2018-10-19 RX ADMIN — HYDROCODONE BITARTRATE AND ACETAMINOPHEN 1 TABLET: 5; 325 TABLET ORAL at 09:30

## 2018-10-19 RX ADMIN — CITALOPRAM 10 MG: 20 TABLET, FILM COATED ORAL at 09:23

## 2018-10-19 RX ADMIN — ALLOPURINOL 300 MG: 300 TABLET ORAL at 09:23

## 2018-10-19 RX ADMIN — HYDROCHLOROTHIAZIDE 25 MG: 25 TABLET ORAL at 09:22

## 2018-10-19 RX ADMIN — DOCUSATE SODIUM 200 MG: 100 CAPSULE, LIQUID FILLED ORAL at 21:03

## 2018-10-19 RX ADMIN — HYDROCODONE BITARTRATE AND ACETAMINOPHEN 1 TABLET: 5; 325 TABLET ORAL at 21:06

## 2018-10-19 RX ADMIN — ASPIRIN 81 MG CHEWABLE TABLET 81 MG: 81 TABLET CHEWABLE at 09:23

## 2018-10-19 RX ADMIN — TIZANIDINE 4 MG: 4 TABLET ORAL at 21:03

## 2018-10-19 RX ADMIN — LISINOPRIL 20 MG: 20 TABLET ORAL at 09:23

## 2018-10-19 RX ADMIN — DOCUSATE SODIUM 200 MG: 100 CAPSULE, LIQUID FILLED ORAL at 09:22

## 2018-10-19 RX ADMIN — ENOXAPARIN SODIUM 40 MG: 40 INJECTION SUBCUTANEOUS at 09:24

## 2018-10-19 RX ADMIN — BACLOFEN 5 MG: 10 TABLET ORAL at 21:03

## 2018-10-19 RX ADMIN — Medication 10 ML: at 09:25

## 2018-10-19 ASSESSMENT — PAIN SCALES - GENERAL
PAINLEVEL_OUTOF10: 7
PAINLEVEL_OUTOF10: 3
PAINLEVEL_OUTOF10: 0
PAINLEVEL_OUTOF10: 6
PAINLEVEL_OUTOF10: 0
PAINLEVEL_OUTOF10: 7
PAINLEVEL_OUTOF10: 0
PAINLEVEL_OUTOF10: 5

## 2018-10-19 ASSESSMENT — PAIN DESCRIPTION - LOCATION
LOCATION: SHOULDER
LOCATION: SHOULDER

## 2018-10-19 ASSESSMENT — PAIN DESCRIPTION - PAIN TYPE
TYPE: CHRONIC PAIN
TYPE: CHRONIC PAIN

## 2018-10-19 ASSESSMENT — PAIN DESCRIPTION - ORIENTATION
ORIENTATION: RIGHT
ORIENTATION: RIGHT

## 2018-10-19 ASSESSMENT — PAIN DESCRIPTION - FREQUENCY
FREQUENCY: CONTINUOUS
FREQUENCY: CONTINUOUS

## 2018-10-19 ASSESSMENT — PAIN DESCRIPTION - DESCRIPTORS
DESCRIPTORS: ACHING;CONSTANT;DISCOMFORT
DESCRIPTORS: ACHING;CONSTANT;DISCOMFORT

## 2018-10-19 NOTE — CARE COORDINATION
FRANNIE Discharge planning:    FRANNIE met with patient and patients family. Patient stated that she would like to go [facility] Patient to have MRI and then pt/ot eval. FRANNIE will follow.  Linda Noonan

## 2018-10-19 NOTE — FLOWSHEET NOTE
MRI notified that patient has PRN medication ordered prior to next MRI attempt. MRI to notify staff when they are sending for patient.  Juan Green  10/19/2018  12:37 PM

## 2018-10-20 PROCEDURE — 6370000000 HC RX 637 (ALT 250 FOR IP): Performed by: INTERNAL MEDICINE

## 2018-10-20 PROCEDURE — 99231 SBSQ HOSP IP/OBS SF/LOW 25: CPT | Performed by: PSYCHIATRY & NEUROLOGY

## 2018-10-20 PROCEDURE — 1200000000 HC SEMI PRIVATE

## 2018-10-20 PROCEDURE — 2580000003 HC RX 258: Performed by: INTERNAL MEDICINE

## 2018-10-20 PROCEDURE — 6360000002 HC RX W HCPCS: Performed by: INTERNAL MEDICINE

## 2018-10-20 RX ORDER — LORAZEPAM 0.5 MG/1
0.5 TABLET ORAL
Status: COMPLETED | OUTPATIENT
Start: 2018-10-20 | End: 2018-10-20

## 2018-10-20 RX ORDER — IPRATROPIUM BROMIDE AND ALBUTEROL SULFATE 2.5; .5 MG/3ML; MG/3ML
1 SOLUTION RESPIRATORY (INHALATION)
Status: DISCONTINUED | OUTPATIENT
Start: 2018-10-20 | End: 2018-10-20

## 2018-10-20 RX ADMIN — LORAZEPAM 0.5 MG: 0.5 TABLET ORAL at 10:11

## 2018-10-20 RX ADMIN — DOCUSATE SODIUM 200 MG: 100 CAPSULE, LIQUID FILLED ORAL at 09:25

## 2018-10-20 RX ADMIN — LISINOPRIL 20 MG: 20 TABLET ORAL at 09:25

## 2018-10-20 RX ADMIN — CITALOPRAM 10 MG: 20 TABLET, FILM COATED ORAL at 09:25

## 2018-10-20 RX ADMIN — ALLOPURINOL 300 MG: 300 TABLET ORAL at 09:25

## 2018-10-20 RX ADMIN — ASPIRIN 81 MG CHEWABLE TABLET 81 MG: 81 TABLET CHEWABLE at 09:25

## 2018-10-20 RX ADMIN — Medication 10 ML: at 10:12

## 2018-10-20 RX ADMIN — DOCUSATE SODIUM 200 MG: 100 CAPSULE, LIQUID FILLED ORAL at 20:33

## 2018-10-20 RX ADMIN — BACLOFEN 5 MG: 10 TABLET ORAL at 20:33

## 2018-10-20 RX ADMIN — ENOXAPARIN SODIUM 40 MG: 40 INJECTION SUBCUTANEOUS at 09:26

## 2018-10-20 RX ADMIN — MAGNESIUM HYDROXIDE 30 ML: 400 SUSPENSION ORAL at 19:10

## 2018-10-20 RX ADMIN — HYDROCHLOROTHIAZIDE 25 MG: 25 TABLET ORAL at 09:25

## 2018-10-20 RX ADMIN — HYDROCODONE BITARTRATE AND ACETAMINOPHEN 1 TABLET: 5; 325 TABLET ORAL at 09:34

## 2018-10-20 ASSESSMENT — PAIN DESCRIPTION - PAIN TYPE: TYPE: CHRONIC PAIN

## 2018-10-20 ASSESSMENT — PAIN SCALES - GENERAL
PAINLEVEL_OUTOF10: 5
PAINLEVEL_OUTOF10: 9
PAINLEVEL_OUTOF10: 2

## 2018-10-20 ASSESSMENT — PAIN DESCRIPTION - DESCRIPTORS: DESCRIPTORS: CRAMPING

## 2018-10-20 ASSESSMENT — PAIN DESCRIPTION - ORIENTATION: ORIENTATION: RIGHT;LEFT

## 2018-10-20 ASSESSMENT — PAIN DESCRIPTION - LOCATION: LOCATION: BACK;LEG

## 2018-10-21 ENCOUNTER — APPOINTMENT (OUTPATIENT)
Dept: MRI IMAGING | Age: 73
DRG: 556 | End: 2018-10-21
Payer: MEDICARE

## 2018-10-21 PROCEDURE — 6360000002 HC RX W HCPCS: Performed by: INTERNAL MEDICINE

## 2018-10-21 PROCEDURE — 6360000002 HC RX W HCPCS: Performed by: PSYCHIATRY & NEUROLOGY

## 2018-10-21 PROCEDURE — 72148 MRI LUMBAR SPINE W/O DYE: CPT

## 2018-10-21 PROCEDURE — 2580000003 HC RX 258: Performed by: INTERNAL MEDICINE

## 2018-10-21 PROCEDURE — 6370000000 HC RX 637 (ALT 250 FOR IP): Performed by: INTERNAL MEDICINE

## 2018-10-21 PROCEDURE — 1200000000 HC SEMI PRIVATE

## 2018-10-21 PROCEDURE — 72146 MRI CHEST SPINE W/O DYE: CPT

## 2018-10-21 RX ORDER — LORAZEPAM 2 MG/ML
2 INJECTION INTRAMUSCULAR ONCE
Status: COMPLETED | OUTPATIENT
Start: 2018-10-21 | End: 2018-10-21

## 2018-10-21 RX ADMIN — ASPIRIN 81 MG CHEWABLE TABLET 81 MG: 81 TABLET CHEWABLE at 08:21

## 2018-10-21 RX ADMIN — DOCUSATE SODIUM 200 MG: 100 CAPSULE, LIQUID FILLED ORAL at 08:21

## 2018-10-21 RX ADMIN — Medication 10 ML: at 08:21

## 2018-10-21 RX ADMIN — MAGNESIUM HYDROXIDE 30 ML: 400 SUSPENSION ORAL at 09:05

## 2018-10-21 RX ADMIN — LISINOPRIL 20 MG: 20 TABLET ORAL at 08:23

## 2018-10-21 RX ADMIN — HYDROCODONE BITARTRATE AND ACETAMINOPHEN 1 TABLET: 5; 325 TABLET ORAL at 21:40

## 2018-10-21 RX ADMIN — LORAZEPAM 2 MG: 2 INJECTION INTRAMUSCULAR; INTRAVENOUS at 13:41

## 2018-10-21 RX ADMIN — CITALOPRAM 10 MG: 20 TABLET, FILM COATED ORAL at 08:20

## 2018-10-21 RX ADMIN — DOCUSATE SODIUM 200 MG: 100 CAPSULE, LIQUID FILLED ORAL at 21:40

## 2018-10-21 RX ADMIN — ENOXAPARIN SODIUM 40 MG: 40 INJECTION SUBCUTANEOUS at 08:21

## 2018-10-21 RX ADMIN — HYDROCHLOROTHIAZIDE 25 MG: 25 TABLET ORAL at 08:22

## 2018-10-21 RX ADMIN — ALLOPURINOL 300 MG: 300 TABLET ORAL at 08:21

## 2018-10-21 RX ADMIN — HYDROCODONE BITARTRATE AND ACETAMINOPHEN 1 TABLET: 5; 325 TABLET ORAL at 01:22

## 2018-10-21 RX ADMIN — BACLOFEN 5 MG: 10 TABLET ORAL at 21:40

## 2018-10-21 RX ADMIN — HYDROCODONE BITARTRATE AND ACETAMINOPHEN 1 TABLET: 5; 325 TABLET ORAL at 10:58

## 2018-10-21 ASSESSMENT — PAIN DESCRIPTION - PAIN TYPE
TYPE: CHRONIC PAIN

## 2018-10-21 ASSESSMENT — PAIN DESCRIPTION - LOCATION
LOCATION: BACK;LEG

## 2018-10-21 ASSESSMENT — PAIN SCALES - GENERAL
PAINLEVEL_OUTOF10: 9
PAINLEVEL_OUTOF10: 5
PAINLEVEL_OUTOF10: 8
PAINLEVEL_OUTOF10: 10
PAINLEVEL_OUTOF10: 3
PAINLEVEL_OUTOF10: 3
PAINLEVEL_OUTOF10: 5
PAINLEVEL_OUTOF10: 9

## 2018-10-21 ASSESSMENT — PAIN DESCRIPTION - FREQUENCY
FREQUENCY: CONTINUOUS
FREQUENCY: CONTINUOUS

## 2018-10-21 ASSESSMENT — PAIN DESCRIPTION - ORIENTATION
ORIENTATION: RIGHT
ORIENTATION: RIGHT;LEFT
ORIENTATION: RIGHT
ORIENTATION: LEFT;RIGHT
ORIENTATION: RIGHT;LEFT

## 2018-10-21 ASSESSMENT — PAIN DESCRIPTION - ONSET
ONSET: ON-GOING

## 2018-10-21 ASSESSMENT — PAIN DESCRIPTION - DESCRIPTORS
DESCRIPTORS: ACHING;CONSTANT;DISCOMFORT
DESCRIPTORS: CRAMPING
DESCRIPTORS: ACHING;CONSTANT;DISCOMFORT
DESCRIPTORS: BURNING;PINS AND NEEDLES

## 2018-10-21 ASSESSMENT — PAIN DESCRIPTION - PROGRESSION: CLINICAL_PROGRESSION: NOT CHANGED

## 2018-10-21 NOTE — FLOWSHEET NOTE
Dr. Miguel Wells called and wanrts neurosurgery updated about MRI tomorrow.  Maki Rodríguez  10/21/2018 5:54 PM

## 2018-10-22 PROCEDURE — 6360000002 HC RX W HCPCS: Performed by: INTERNAL MEDICINE

## 2018-10-22 PROCEDURE — 2580000003 HC RX 258: Performed by: INTERNAL MEDICINE

## 2018-10-22 PROCEDURE — 1200000000 HC SEMI PRIVATE

## 2018-10-22 PROCEDURE — 6370000000 HC RX 637 (ALT 250 FOR IP): Performed by: INTERNAL MEDICINE

## 2018-10-22 RX ADMIN — BACLOFEN 5 MG: 10 TABLET ORAL at 21:15

## 2018-10-22 RX ADMIN — ASPIRIN 81 MG CHEWABLE TABLET 81 MG: 81 TABLET CHEWABLE at 08:32

## 2018-10-22 RX ADMIN — HYDROCODONE BITARTRATE AND ACETAMINOPHEN 1 TABLET: 5; 325 TABLET ORAL at 23:09

## 2018-10-22 RX ADMIN — CITALOPRAM 10 MG: 20 TABLET, FILM COATED ORAL at 08:32

## 2018-10-22 RX ADMIN — TIZANIDINE 4 MG: 4 TABLET ORAL at 21:39

## 2018-10-22 RX ADMIN — DOCUSATE SODIUM 200 MG: 100 CAPSULE, LIQUID FILLED ORAL at 21:37

## 2018-10-22 RX ADMIN — HYDROCHLOROTHIAZIDE 25 MG: 25 TABLET ORAL at 08:32

## 2018-10-22 RX ADMIN — MAGNESIUM HYDROXIDE 30 ML: 400 SUSPENSION ORAL at 08:43

## 2018-10-22 RX ADMIN — ENOXAPARIN SODIUM 40 MG: 40 INJECTION SUBCUTANEOUS at 08:33

## 2018-10-22 RX ADMIN — ALLOPURINOL 300 MG: 300 TABLET ORAL at 08:33

## 2018-10-22 RX ADMIN — LISINOPRIL 20 MG: 20 TABLET ORAL at 08:32

## 2018-10-22 RX ADMIN — HYDROCODONE BITARTRATE AND ACETAMINOPHEN 1 TABLET: 5; 325 TABLET ORAL at 16:51

## 2018-10-22 RX ADMIN — HYDROCODONE BITARTRATE AND ACETAMINOPHEN 1 TABLET: 5; 325 TABLET ORAL at 08:32

## 2018-10-22 RX ADMIN — DOCUSATE SODIUM 200 MG: 100 CAPSULE, LIQUID FILLED ORAL at 08:32

## 2018-10-22 ASSESSMENT — PAIN SCALES - GENERAL
PAINLEVEL_OUTOF10: 9
PAINLEVEL_OUTOF10: 5
PAINLEVEL_OUTOF10: 5
PAINLEVEL_OUTOF10: 4
PAINLEVEL_OUTOF10: 9
PAINLEVEL_OUTOF10: 9

## 2018-10-22 ASSESSMENT — PAIN DESCRIPTION - PROGRESSION
CLINICAL_PROGRESSION: NOT CHANGED
CLINICAL_PROGRESSION: GRADUALLY IMPROVING
CLINICAL_PROGRESSION: GRADUALLY IMPROVING

## 2018-10-22 ASSESSMENT — PAIN DESCRIPTION - PAIN TYPE
TYPE: CHRONIC PAIN
TYPE: CHRONIC PAIN
TYPE: SURGICAL PAIN

## 2018-10-22 ASSESSMENT — PAIN DESCRIPTION - DESCRIPTORS
DESCRIPTORS: ACHING;CONSTANT
DESCRIPTORS: ACHING
DESCRIPTORS: ACHING

## 2018-10-22 ASSESSMENT — PAIN DESCRIPTION - ONSET
ONSET: ON-GOING
ONSET: ON-GOING

## 2018-10-22 ASSESSMENT — PAIN DESCRIPTION - LOCATION
LOCATION: BACK;LEG
LOCATION: BACK;LEG
LOCATION: BACK

## 2018-10-22 ASSESSMENT — PAIN DESCRIPTION - ORIENTATION
ORIENTATION: RIGHT
ORIENTATION: RIGHT

## 2018-10-22 ASSESSMENT — PAIN DESCRIPTION - FREQUENCY
FREQUENCY: CONTINUOUS
FREQUENCY: CONTINUOUS

## 2018-10-23 PROCEDURE — G8988 SELF CARE GOAL STATUS: HCPCS

## 2018-10-23 PROCEDURE — 97530 THERAPEUTIC ACTIVITIES: CPT

## 2018-10-23 PROCEDURE — 97164 PT RE-EVAL EST PLAN CARE: CPT

## 2018-10-23 PROCEDURE — G8987 SELF CARE CURRENT STATUS: HCPCS

## 2018-10-23 PROCEDURE — 1200000000 HC SEMI PRIVATE

## 2018-10-23 PROCEDURE — G8979 MOBILITY GOAL STATUS: HCPCS

## 2018-10-23 PROCEDURE — 6370000000 HC RX 637 (ALT 250 FOR IP): Performed by: INTERNAL MEDICINE

## 2018-10-23 PROCEDURE — G8978 MOBILITY CURRENT STATUS: HCPCS

## 2018-10-23 PROCEDURE — 97168 OT RE-EVAL EST PLAN CARE: CPT

## 2018-10-23 PROCEDURE — 97162 PT EVAL MOD COMPLEX 30 MIN: CPT

## 2018-10-23 RX ADMIN — DOCUSATE SODIUM 200 MG: 100 CAPSULE, LIQUID FILLED ORAL at 21:07

## 2018-10-23 RX ADMIN — HYDROCHLOROTHIAZIDE 25 MG: 25 TABLET ORAL at 09:14

## 2018-10-23 RX ADMIN — CITALOPRAM 10 MG: 20 TABLET, FILM COATED ORAL at 09:14

## 2018-10-23 RX ADMIN — MAGNESIUM HYDROXIDE 30 ML: 400 SUSPENSION ORAL at 09:14

## 2018-10-23 RX ADMIN — LISINOPRIL 20 MG: 20 TABLET ORAL at 09:14

## 2018-10-23 RX ADMIN — BACLOFEN 5 MG: 10 TABLET ORAL at 21:07

## 2018-10-23 RX ADMIN — DOCUSATE SODIUM 200 MG: 100 CAPSULE, LIQUID FILLED ORAL at 09:12

## 2018-10-23 RX ADMIN — HYDROCODONE BITARTRATE AND ACETAMINOPHEN 1 TABLET: 5; 325 TABLET ORAL at 09:17

## 2018-10-23 RX ADMIN — HYDROCODONE BITARTRATE AND ACETAMINOPHEN 1 TABLET: 5; 325 TABLET ORAL at 16:48

## 2018-10-23 RX ADMIN — ALLOPURINOL 300 MG: 300 TABLET ORAL at 09:17

## 2018-10-23 ASSESSMENT — PAIN DESCRIPTION - PAIN TYPE
TYPE: ACUTE PAIN

## 2018-10-23 ASSESSMENT — PAIN DESCRIPTION - DESCRIPTORS
DESCRIPTORS: ACHING;CRAMPING;DISCOMFORT
DESCRIPTORS: ACHING;DISCOMFORT;DULL
DESCRIPTORS: ACHING;CONSTANT;DISCOMFORT
DESCRIPTORS: ACHING;CRAMPING;DISCOMFORT

## 2018-10-23 ASSESSMENT — PAIN DESCRIPTION - PROGRESSION
CLINICAL_PROGRESSION: NOT CHANGED

## 2018-10-23 ASSESSMENT — PAIN DESCRIPTION - FREQUENCY
FREQUENCY: CONTINUOUS

## 2018-10-23 ASSESSMENT — PAIN DESCRIPTION - ORIENTATION
ORIENTATION: RIGHT

## 2018-10-23 ASSESSMENT — PAIN DESCRIPTION - LOCATION
LOCATION: GENERALIZED

## 2018-10-23 ASSESSMENT — PAIN SCALES - GENERAL
PAINLEVEL_OUTOF10: 0
PAINLEVEL_OUTOF10: 6
PAINLEVEL_OUTOF10: 5
PAINLEVEL_OUTOF10: 0
PAINLEVEL_OUTOF10: 7
PAINLEVEL_OUTOF10: 7

## 2018-10-23 ASSESSMENT — PAIN DESCRIPTION - ONSET
ONSET: ON-GOING

## 2018-10-23 NOTE — PROGRESS NOTES
Acute Rehab Pre-Admission Screen      Referral date: 10/17/18   1251  Onset/Hospital Admit Date: 10/16/2018  5:51 PM  Current Location: [facility]  Admitting Diagnosis: weakness of bilateral lower extremities   Surgery /  Date:    Name: Carlyle Claremore: 1945  Age: 72 y.o. Address: Doc Purvis [address] [place] [address], [address]  Home Phone: [phone] (home)  Social Security #: [id number]    Sex: female  Race:   Marital Status:    Ethnic/Cultural/Methodist Considerations: Sikhism    Advanced Directives: [x] Full Code  [] [facility] [] Medications only       [] Living Will  [] DPOA      []Organ donor      [] No mechanical breathing or ventilation     [] no tube feeding, nutrition or hydration      [x] Patient does not have advanced directives or living will     Copies in Chart: n/a    COVERAGE INFORMATION   Primary Insurer: medicare    Secondary Insurer: [address] Medicare #: [id number]    Verified coverage: [] Patient  [] Family/caregiver    [x] financial department [] insurance carrier    PHYSICIAN / Rohan Diggs    Referring Physician: ARTURO Tello  Attending Physician: Sheryl Morel MD  Primary Care Physician: Frankie Somers MD  Consultants/Opinions (see full consult notes on chart): Mariaa Dobbins ( neurosurgery) - see consult    SOCIAL INFORMATION    Primary  Contact: Silvio Myers  Relationship: son  Primary Phone: [phone]  Secondary Phone:   Secondary  Contact: Hen[place]  Relationship: daughter  Primary Phone: [phone]  Secondary Phone:     Previous Community Services:   Caregiver available: [] Yes [] No Hours per day available:   Patient previously employed:  [] Yes [] Part Time [] Full Time [] No [] Retired  Occupation/Profession:   Prior living arrangements: [] Home  [] Assisted living  [] SNF [] Other  Lived with:  [] Alone  [] Spouse  [] Family  [] Other  Lived with:   Contact phone:   Home:   story home   entry steps  Rails:   Steps:   to 2nd floor  Rails:
Pt notified that we are still following her case and waiting for work up to be completed.
Reno Ham is a 72 y.o. female patient.     Current Facility-Administered Medications   Medication Dose Route Frequency Provider Last Rate Last Dose    HYDROcodone-acetaminophen (NORCO) 5-325 MG per tablet 1 tablet  1 tablet Oral Q6H PRN Surendra Humphrey MD   1 tablet at 10/20/18 0934    acetaminophen (TYLENOL) tablet 650 mg  650 mg Oral Q4H PRN Surendra Humphrey MD        allopurinol (ZYLOPRIM) tablet 300 mg  300 mg Oral Daily Surendra Humphrey MD   300 mg at 10/20/18 2880    aspirin chewable tablet 81 mg  81 mg Oral Daily Surendra Humphrey MD   81 mg at 10/20/18 5937    baclofen (LIORESAL) tablet 5 mg  5 mg Oral Nightly Surendra Humphrey MD   5 mg at 10/19/18 2103    citalopram (CELEXA) tablet 10 mg  10 mg Oral Daily Surendra Humphrey MD   10 mg at 10/20/18 4846    docusate sodium (COLACE) capsule 200 mg  200 mg Oral BID Surendra Humphrey MD   200 mg at 10/20/18 0925    tiZANidine (ZANAFLEX) tablet 4 mg  4 mg Oral Q8H PRN Surendra Humphrey MD   4 mg at 10/19/18 2103    sodium chloride flush 0.9 % injection 10 mL  10 mL Intravenous 2 times per day Surendra Humphrey MD   10 mL at 10/20/18 1012    sodium chloride flush 0.9 % injection 10 mL  10 mL Intravenous PRN Surendra Humphrey MD        potassium chloride (KLOR-CON M) extended release tablet 40 mEq  40 mEq Oral PRN Surendra Humphrey MD   40 mEq at 10/17/18 1048    Or    potassium chloride 20 MEQ/15ML (10%) oral solution 40 mEq  40 mEq Oral PRN Surendra Humphrey MD        Or    potassium chloride 10 mEq/100 mL IVPB (Peripheral Line)  10 mEq Intravenous PRN Surendra Humphrey MD        magnesium hydroxide (MILK OF MAGNESIA) 400 MG/5ML suspension 30 mL  30 mL Oral Daily PRN Surendra Humphrey MD   30 mL at 10/17/18 1644    ondansetron (ZOFRAN) injection 4 mg  4 mg Intravenous Q6H PRN Surendra Humphrey MD        enoxaparin (LOVENOX) injection 40 mg  40 mg Subcutaneous Daily Surendra Humphrey MD   40 mg at 10/20/18 0926    lisinopril (PRINIVIL;ZESTRIL) tablet 20 mg  20 mg Oral Daily Surendra Humphrey MD   20 mg at 10/20/18 7778    And   
Wolf Coelho is a 72 y.o. right handed female     Neurology is following for BLE weakness    (+)BLE weakness  No other new neuro complaints     No family present    Objective:     BP (!) 147/63   Pulse 77   Temp 97.3 °F (36.3 °C) (Temporal)   Resp 16   Ht 5' 7\" (1.702 m)   Wt 234 lb 1.6 oz (106.2 kg)   SpO2 95%   BMI 36.67 kg/m²     Mental Status: Alert, oriented x4, thought content appropriate     Appropriate attention/concentration  Intact fundus of knowledge  Intact memories  Repetition intact    Speech: no dysarthria  Language: no aphasias    Cranial Nerves:  I: smell NA   II: visual acuity  NA   II: visual fields Full to finger counting    II: pupils PERRL   III,VII: ptosis None   III,IV,VI: extraocular muscles  EOMI without nystagmus   V: mastication Normal   V: facial light touch sensation  Normal   V,VII: corneal reflex     VII: facial muscle function - upper  Normal   VII: facial muscle function - lower Normal   VIII: hearing Normal   IX: soft palate elevation  Normal   IX,X: gag reflex    XI: trapezius strength  5/5   XI: sternocleidomastoid strength 5/5   XI: neck extension strength  5/5   XII: tongue strength  Normal     Motor:  LUE 5/5, RUE 4/5 d/t shoulder injury/arthritis  LLE 1/5, RLE 1+/5  No abnormal movements  Normal bulk and tone    Sensory:  LT and PP intact throughout     Coordination:   FNF intact bilaterally  Unable to complete HKS exam d/t weakness     Gait:  Not tested      DTR:   Right Brachioradialis reflex 2+  Left Brachioradialis reflex 2+  Right Biceps reflex 2+  Left Biceps reflex 2+  Right Triceps reflex 2+  Left Triceps reflex 2+  Right Quadriceps reflex 1+  Left Quadriceps reflex 1+  Right Achilles reflex 1+  Left Achilles reflex 1+    Babinskis up going bilaterally    No pathological reflexes    Laboratory/Radiology:     CBC:   Lab Results   Component Value Date    WBC 6.4 10/18/2018    RBC 4.16 10/18/2018    HGB 11.4 10/18/2018    HCT 36.7 10/18/2018    MCV 88.2
technique, precautions. Assessment of current deficits   Functional mobility [x]  ADLs [x] Strength [x]  Cognition []  Functional transfers  [x] IADLs [x] Safety Awareness [x]  Endurance [x]  Fine Motor Coordination [] Balance [x] Vision/perception [] Sensation []   Gross Motor Coordination [] ROM []       Re- Eval Complexity: low    Treatment frequency: 15-30 min. 4-5X/wk. Plan of Care:  ADL retraining [x]   Equipment needs [x]   Neuromuscular re-education []  Energy Conservation Techniques [x]  Functional Transfer training [x]  Patient and/or Family Education [x]  Functional Mobility training [x]  Environmental Modifications []  Cognitive re-training []    Compensatory techniques for ADLs [x]  Splinting Needs []   Positioning to improve overall function []  Other: []      Rehab Potential: fair    Patient / Family Goal: to return to PLOF/Ind. Short term goals  Time Frame: 4-5 days  Goal (1)  Pt. will increase feeding & grooming to Mod I  Goal (2)  Pt. will increase UB bathing & dressing to s/u  Goal (3)  Pt. will increase LB bathing & dressing to Min A  Goal (4)  Pt. will increase bed mobility to Min A  Goal (5)  Pt. will increase functional transfers & mobility to Min A  Goal (6) Pt. will demonstrate (wfl) safety and understanding of precautions during self-care routine/ functional transfers. Goal (7) Pt. will improve UB strength by 1/2 muscle grade    Patient and/or family understands diagnosis, prognosis and plan of care: yes    [] Malnutrition indicators have been identified and nursing has been notified to ensure a dietitian consult is ordered. Time in: 13:50  Time out: 14:15  Total Tx Time: re-eval + 10 min. functional treat/ax./ instruction     Luly Saucedo.  Winnie, MERVATR/L   License #  [id number]

## 2018-10-23 NOTE — CARE COORDINATION
FRANNIE Discharge planning:    SW met with patient. Patient would like to go to [facility] ARU. ARU is following patient. Patient reports that if ARU cannot accept her then she would like to go to [facility]. Tentative referral made at this time. SW also spoke with therapy for updated notes. SW to follow.  [place], [place]

## 2018-10-24 VITALS
WEIGHT: 235 LBS | RESPIRATION RATE: 16 BRPM | SYSTOLIC BLOOD PRESSURE: 123 MMHG | TEMPERATURE: 97.5 F | BODY MASS INDEX: 36.88 KG/M2 | DIASTOLIC BLOOD PRESSURE: 58 MMHG | OXYGEN SATURATION: 97 % | HEART RATE: 77 BPM | HEIGHT: 67 IN

## 2018-10-24 PROCEDURE — 6370000000 HC RX 637 (ALT 250 FOR IP): Performed by: INTERNAL MEDICINE

## 2018-10-24 RX ADMIN — ALLOPURINOL 300 MG: 300 TABLET ORAL at 08:46

## 2018-10-24 RX ADMIN — LISINOPRIL 20 MG: 20 TABLET ORAL at 08:46

## 2018-10-24 RX ADMIN — HYDROCODONE BITARTRATE AND ACETAMINOPHEN 1 TABLET: 5; 325 TABLET ORAL at 00:31

## 2018-10-24 RX ADMIN — TIZANIDINE 4 MG: 4 TABLET ORAL at 00:45

## 2018-10-24 RX ADMIN — DOCUSATE SODIUM 200 MG: 100 CAPSULE, LIQUID FILLED ORAL at 08:46

## 2018-10-24 RX ADMIN — CITALOPRAM 10 MG: 20 TABLET, FILM COATED ORAL at 08:46

## 2018-10-24 RX ADMIN — MAGNESIUM HYDROXIDE 30 ML: 400 SUSPENSION ORAL at 08:46

## 2018-10-24 RX ADMIN — HYDROCHLOROTHIAZIDE 25 MG: 25 TABLET ORAL at 08:46

## 2018-10-24 ASSESSMENT — PAIN DESCRIPTION - PAIN TYPE: TYPE: ACUTE PAIN

## 2018-10-24 ASSESSMENT — PAIN DESCRIPTION - ORIENTATION: ORIENTATION: RIGHT;LEFT

## 2018-10-24 ASSESSMENT — PAIN DESCRIPTION - FREQUENCY: FREQUENCY: CONTINUOUS

## 2018-10-24 ASSESSMENT — PAIN SCALES - GENERAL
PAINLEVEL_OUTOF10: 9
PAINLEVEL_OUTOF10: 0

## 2018-10-24 ASSESSMENT — PAIN DESCRIPTION - ONSET: ONSET: ON-GOING

## 2018-10-24 ASSESSMENT — PAIN DESCRIPTION - LOCATION: LOCATION: BACK;HIP;LEG

## 2018-10-24 ASSESSMENT — PAIN DESCRIPTION - PROGRESSION: CLINICAL_PROGRESSION: NOT CHANGED

## 2018-10-24 ASSESSMENT — PAIN DESCRIPTION - DESCRIPTORS: DESCRIPTORS: ACHING;SPASM

## 2018-10-24 NOTE — DISCHARGE INSTR - COC
Continuity of Care Form    Patient Name: Arvin Bowling   :    MRN:  [id number]    Admit date:  10/16/2018  Discharge date:  10/24/18    Code Status Order: Full Code   Advance Directives:   [address] [facility] Documentation     Date/Time Healthcare Directive Type of Healthcare Directive Copy in [address] Agent's Name Healthcare Agent's Phone Number    10/17/18 0033  No, patient does not have an advance directive for healthcare treatment -- -- -- -- --          Admitting Physician:  Flor Jacobson MD  PCP: Caron Lundborg, MD    Discharging Nurse: [facility] Unit/Room#: 5402/5402-B  Discharging Unit Phone Number: [phone]    Emergency Contact:   Extended Emergency Contact Information  Primary Emergency Contact: [address] Phone: [phone]  Relation: Child  Secondary Emergency Contact: [address] Phone: [phone]  Relation: Child    Past Surgical History:  Past Surgical History:   Procedure Laterality Date    BACK SURGERY      BREAST SURGERY      COLONOSCOPY      LUMBAR FUSION      LUMBAR LAMINECTOMY  2013    and fusion lumbar1-lumbar4    SPINE SURGERY      x 3    THORACIC LAMINECTOMY  2015    T9-12    TONSILLECTOMY         Immunization History: There is no immunization history on file for this patient.     Active Problems:  Patient Active Problem List   Diagnosis Code    Back pain M54.9    Anemia D64.9    Ambulatory dysfunction R26.2    Spinal cord injury [id number]    Thoracic spinal stenosis M48.04    Obesity E66.9    Weakness of both lower extremities R29.898    Debility R53.81       Isolation/Infection:   Isolation          No Isolation            Nurse Assessment:  Last Vital Signs: BP (!) 123/58   Pulse 77   Temp 97.5 °F (36.4 °C) (Temporal)   Resp 16   Ht 5' 7\" (1.702 m)   Wt 235 lb (106.6 kg)   SpO2 97%   BMI 36.81 kg/m²     Last

## 2018-10-29 DIAGNOSIS — R29.898 LEG WEAKNESS, BILATERAL: Primary | ICD-10-CM

## 2018-10-30 NOTE — DISCHARGE SUMMARY
Physician Discharge Summary     Patient ID:  Shahab Cardona  [id number]  13 y.o.  1945    Admit date: 10/16/2018    Discharge date and time: 10/24/2018  Admission Diagnoses:   Patient Active Problem List   Diagnosis    Back pain    Anemia    Ambulatory dysfunction    Spinal cord injury    Thoracic spinal stenosis    Obesity    Weakness of both lower extremities    Debility       Discharge Diagnoses:b/l lower extremities flaccidity     Consults: neurology and neurosurgery     Procedures: none     Hospital Course: The patient is a 72 y.o. female of Araceli Wolf MD with significant past medical history of back surgery remotely  who presents with complaints of lower extremity weakness. sHe tells me she went to the bathroom and wanted to squat down to do a bit of activity. She was unable to get back up. She scooted herself to  An area where she was able to lift herself up using handrails. She felt ok and was able to ambulate using her walker. Next morning , she was again so weak that she was not able to get  Out of bed. No bowel bladder incontinence, no alarm symptoms. ,      Pt was thoroughly evaluated for flaccidity . No answers during inpt stay,. She was also evaluated for posible acute rehab. Ct myelogram to be scheduled as outpatient as pt not able to have it done d/t being on asa. Apparently needs to be held for 5 days,. She is to return to have this done. Vitals and blood work unremarkable. No results for input(s): WBC, HGB, HCT, PLT in the last 72 hours. No results for input(s): NA, K, CL, CO2, BUN, CREATININE, CALCIUM in the last 72 hours.     Invalid input(s): GLU    Ct Lumbar Spine Wo Contrast    Result Date: 10/16/2018  Patient MRN:  [id number] : 1945 Age: 72 years Gender: Female Order Date:  10/16/2018 6:15 PM TECHNIQUE/NUMBER OF IMAGES/COMPARISON/CLINICAL HISTORY: CT Lumbar Axial with sagittal and coronal reconstructions Comparison with study of the  215. 89year-old female patient with extremity weakness had surgery in the back about 4 years ago. Head trauma left week. FINDINGS: As evaluated by the  radiographs and by the axial, sagittal and coronal reconstructions patient had an extensive laminectomy and fusion of the lumbar spine extending from T8 through S1. The the posterior instrumentation fusion with interpedicular screws were done in 2 steps. The upper segment is for T8, T9, T10 with the bridging between the rods for the lower segment. The lower segment is by passing T11, and made for T12, L1, L2, L3, L4, by passing L5, and for S1. Accounting the difficult to evaluate fine bone detail due to the streaking artifacts by the orthopedic hardware cannot conspicuously identified with a acute type of fracture in the visualized lower thoracic spine segments and lumbar spine segment and upper sacral spine. No additional bony fusion has been performed throughout the instrumentation. Cannot conspicuously identified at the loosening of the orthopedic hardware. The canal is decompressed by the laminectomy but detail is obscured by the artifacts present. Cannot see an acute fracture in the sacral wings. Old misalignments are observed at the L4-5 and L5-S1 which are stabilized by the fusion. 1. Extensive surgery and fusion of the lumbar spine as commented. 2. Considering de difficult to evaluate the fine detail due to the streak artifacts of the orthopedic hardware cannot conspicuously identified a new acute fracture or dislocation in the lower thoracic/lumbar/upper sacral spine. 3. Old misalignments at L4-5 and L5-S1 stabilized by diffusion. Discharge Exam:    HEENT: NCAT,  PERRLA, No JVD  Heart:  RRR, no murmurs, gallops, or rubs.   Lungs:  CTA bilaterally, no wheeze, rales or rhonchi  Abd: bowel sounds present, nontender, nondistended, no masses  Extrem:  No clubbing, cyanosis, or edema    Disposition: home     Patient Instructions:      Medication List      CHANGE

## 2018-11-09 DIAGNOSIS — M54.2 NECK PAIN: ICD-10-CM

## 2018-11-09 DIAGNOSIS — M54.40 LOW BACK PAIN WITH SCIATICA, SCIATICA LATERALITY UNSPECIFIED, UNSPECIFIED BACK PAIN LATERALITY, UNSPECIFIED CHRONICITY: Primary | ICD-10-CM

## 2018-11-09 DIAGNOSIS — M79.609 PAIN IN EXTREMITY AT MULTIPLE SITES: ICD-10-CM

## 2018-11-09 RX ORDER — SODIUM CHLORIDE 0.9 % (FLUSH) 0.9 %
10 SYRINGE (ML) INJECTION PRN
Status: CANCELLED | OUTPATIENT
Start: 2018-11-09

## 2018-11-12 ENCOUNTER — TELEPHONE (OUTPATIENT)
Dept: NEUROSURGERY | Age: 73
End: 2018-11-12

## 2018-11-12 ENCOUNTER — HOSPITAL ENCOUNTER (OUTPATIENT)
Dept: GENERAL RADIOLOGY | Age: 73
Discharge: HOME | End: 2018-11-14
Payer: MEDICARE

## 2018-11-12 ENCOUNTER — HOSPITAL ENCOUNTER (OUTPATIENT)
Dept: CT IMAGING | Age: 73
Discharge: HOME | End: 2018-11-14
Payer: MEDICARE

## 2018-11-12 VITALS
SYSTOLIC BLOOD PRESSURE: 121 MMHG | BODY MASS INDEX: 33.74 KG/M2 | DIASTOLIC BLOOD PRESSURE: 57 MMHG | HEART RATE: 76 BPM | WEIGHT: 215 LBS | RESPIRATION RATE: 16 BRPM | OXYGEN SATURATION: 100 % | TEMPERATURE: 98.5 F | HEIGHT: 67 IN

## 2018-11-12 DIAGNOSIS — R29.898 LEG WEAKNESS, BILATERAL: ICD-10-CM

## 2018-11-12 DIAGNOSIS — M54.2 NECK PAIN: ICD-10-CM

## 2018-11-12 DIAGNOSIS — M79.609 PAIN IN EXTREMITY AT MULTIPLE SITES: ICD-10-CM

## 2018-11-12 DIAGNOSIS — M54.40 LOW BACK PAIN WITH SCIATICA, SCIATICA LATERALITY UNSPECIFIED, UNSPECIFIED BACK PAIN LATERALITY, UNSPECIFIED CHRONICITY: ICD-10-CM

## 2018-11-12 LAB
INR BLD: 1.2
PLATELET # BLD: 291 E9/L (ref 130–450)
PROTHROMBIN TIME: 13.5 SEC (ref 9.3–12.4)

## 2018-11-12 PROCEDURE — 36415 COLL VENOUS BLD VENIPUNCTURE: CPT

## 2018-11-12 PROCEDURE — 72270 MYELOGPHY 2/> SPINE REGIONS: CPT

## 2018-11-12 PROCEDURE — 7100000010 HC PHASE II RECOVERY - FIRST 15 MIN

## 2018-11-12 PROCEDURE — 72126 CT NECK SPINE W/DYE: CPT

## 2018-11-12 PROCEDURE — 72129 CT CHEST SPINE W/DYE: CPT

## 2018-11-12 PROCEDURE — 6360000004 HC RX CONTRAST MEDICATION: Performed by: PHYSICIAN ASSISTANT

## 2018-11-12 PROCEDURE — 85610 PROTHROMBIN TIME: CPT

## 2018-11-12 PROCEDURE — 72132 CT LUMBAR SPINE W/DYE: CPT

## 2018-11-12 PROCEDURE — 7100000011 HC PHASE II RECOVERY - ADDTL 15 MIN

## 2018-11-12 PROCEDURE — 85049 AUTOMATED PLATELET COUNT: CPT

## 2018-11-12 RX ORDER — LIDOCAINE HYDROCHLORIDE 20 MG/ML
10 INJECTION, SOLUTION EPIDURAL; INFILTRATION; INTRACAUDAL; PERINEURAL ONCE
Status: DISCONTINUED | OUTPATIENT
Start: 2018-11-12 | End: 2018-11-15 | Stop reason: HOSPADM

## 2018-11-12 RX ORDER — SODIUM CHLORIDE 0.9 % (FLUSH) 0.9 %
10 SYRINGE (ML) INJECTION PRN
Status: DISCONTINUED | OUTPATIENT
Start: 2018-11-12 | End: 2018-11-15 | Stop reason: HOSPADM

## 2018-11-12 RX ADMIN — IOPAMIDOL 15 ML: 612 INJECTION, SOLUTION INTRATHECAL at 11:54

## 2018-11-12 ASSESSMENT — PAIN SCALES - GENERAL
PAINLEVEL_OUTOF10: 0

## 2018-11-12 ASSESSMENT — PAIN - FUNCTIONAL ASSESSMENT: PAIN_FUNCTIONAL_ASSESSMENT: 0-10

## 2018-11-12 NOTE — H&P (VIEW-ONLY)
Department of Internal Medicine  General Internal Medicine  Surendra Humphrey M.D. History and Physical      CHIEF COMPLAINT:  Weakness in legs b/ l     Reason for Admission:  As above /possible placement     History Obtained From:  patient     HISTORY OF PRESENT ILLNESS:      The patient is a 72 y.o. female of Alana Potts MD with significant past medical history of back surgery remotely  who presents with complaints of lower extremity weakness. sHe tells me she went to the bathroom and wanted to squat down to do a bit of activity. She was unable to get back up. She scooted herself to  An area where she was able to lift herself up using handrails. She felt ok and was able to ambulate using her walker. Next morning , she was again so weak that she was not able to get  Out of bed. No bowel bladder incontinence, no alarm symptoms.  ,      Past Medical History:        Diagnosis Date    Anterolisthesis     Back pain 1/11/2013    Bulging lumbar disc     DDD (degenerative disc disease)     Facet arthropathy, lumbar     Herniated lumbar intervertebral disc     History of blood transfusion     Hypertension     Leg pain, bilateral     Neural foraminal stenosis of lumbar spine     Osteoarthritis     Pinched vertebral nerve     Sciatic pain     Spinal stenosis of lumbar region      Past Surgical History:        Procedure Laterality Date    BACK SURGERY      BREAST SURGERY      COLONOSCOPY      LUMBAR FUSION      LUMBAR LAMINECTOMY  04/29/2013    and fusion lumbar1-lumbar4    SPINE SURGERY      x 3    THORACIC LAMINECTOMY  04/29/2015    T9-12    TONSILLECTOMY           Medications Prior to Admission:    Prescriptions Prior to Admission: lisinopril-hydrochlorothiazide (PRINZIDE;ZESTORETIC) 20-25 MG per tablet, Take 1 tablet by mouth daily  omeprazole (PRILOSEC) 20 MG delayed release capsule, Take 1 capsule by mouth daily  tiZANidine (ZANAFLEX) 2 MG tablet, Take 2 tablets by mouth every 8 hours as needed (muscle

## 2018-11-12 NOTE — INTERVAL H&P NOTE
H&P Update    Patient's History and Physical  was reviewed. The patient appears likely to able to tolerate the procedure. Risk and benefits discussed including ultimate complications, possibly death and consent obtained.     Jayce Serra PA-C, supervised by  Pili Gentile II

## 2018-11-12 NOTE — BRIEF OP NOTE
Brief Postoperative Note    Rk Gregorio  YOB: 1945  [id number]    Pre-operative Diagnosis and Procedure: cervical, thoracic, and lumbar myelogram    Post-operative Diagnosis: Same    Anesthesia: Local    Estimated Blood Loss: < 10 cc    Provider: Aba Israel PA-C, supervised by Subha CARRANZA     Complications: none    Specimen obtained: none     Findings: none     JOSE HURT   11/12/2018 11:21 AM

## 2018-11-12 NOTE — PROGRESS NOTES
Received bedside handoff report. No complaints family at bedside. [address] [facility] speaks with patient and family. 1115 to fluoro room in stable condition.

## 2018-11-14 ENCOUNTER — PREP FOR PROCEDURE (OUTPATIENT)
Dept: NEUROSURGERY | Age: 73
End: 2018-11-14

## 2018-11-14 DIAGNOSIS — M48.04 THORACIC STENOSIS: Primary | ICD-10-CM

## 2018-11-14 RX ORDER — SODIUM CHLORIDE 0.9 % (FLUSH) 0.9 %
10 SYRINGE (ML) INJECTION EVERY 12 HOURS SCHEDULED
Status: CANCELLED | OUTPATIENT
Start: 2018-11-14

## 2018-11-14 RX ORDER — SODIUM CHLORIDE 0.9 % (FLUSH) 0.9 %
10 SYRINGE (ML) INJECTION PRN
Status: CANCELLED | OUTPATIENT
Start: 2018-11-14

## 2018-11-14 RX ORDER — SODIUM CHLORIDE 9 MG/ML
INJECTION, SOLUTION INTRAVENOUS CONTINUOUS
Status: CANCELLED | OUTPATIENT
Start: 2018-11-14

## 2018-11-21 RX ORDER — BISACODYL 10 MG
10 SUPPOSITORY, RECTAL RECTAL DAILY PRN
COMMUNITY

## 2018-11-21 RX ORDER — POLYETHYLENE GLYCOL 3350 17 G/17G
17 POWDER, FOR SOLUTION ORAL DAILY
COMMUNITY

## 2018-11-21 RX ORDER — ACETAMINOPHEN 325 MG/1
650 TABLET ORAL EVERY 4 HOURS PRN
COMMUNITY

## 2018-11-21 RX ORDER — HYDROCODONE BITARTRATE AND ACETAMINOPHEN 5; 325 MG/1; MG/1
1 TABLET ORAL EVERY 4 HOURS PRN
Status: ON HOLD | COMMUNITY
End: 2018-11-30 | Stop reason: HOSPADM

## 2018-11-21 NOTE — PROGRESS NOTES
remainder of the day due to having had anesthesia. PARKING INSTRUCTIONS:   · [x] Arrival Time 0500 AM  ·  Parking lot 1 is located on [facility] (the corner of [facility] and [facility]). To enter, press the button and the gate will lift. A free token will be provided to exit the lot. One car per patient is allowed to park in this lot. All other cars are to park on [address] [place] [address] Street either in the parking garage or the handicap lot. [] Free  parking is available on [address] [place] [address]. · [] To reach the [facility] lobby from [address] [place] [address], upon entering the hospital, take elevator B to the 3rd floor. EDUCATION INSTRUCTIONS:      [] Knee or hip replacement booklet & exercise pamphlets given. [] Marcella Howell placed in chart. [] Pre-admission Testing educational folder given  [] Incentive Spirometry,coughing & deep breathing exercises reviewed. []Medication information sheet(s)   []Fluoroscopy-Xray used in surgery reviewed with patient. Educational pamphlet placed in chart. []Pain: Post-op pain is normal and to be expected. You will be asked to rate your pain from 0-10(a zero is not acceptable-education is needed). Your post-op pain goal is:  [] Ask your nurse for your pain medication. [] Joint camp offered. [] Joint replacement booklets given. []Other     MEDICATION INSTRUCTIONS:   [x]Bring a complete list of your medications, please write the last time you took the medicine, give this list to the nurse. [x] Take the following medications the morning of surgery with 1-2 ounces of water: SEE MED LIST  [x] Stop herbal supplements and vitamins 5 days before your surgery. [] DO NOT take any diabetic medicine the morning of surgery. Follow instructions for insulin the day before surgery. [] If you are diabetic and your blood sugar is low or you feel symptomatic, you may drink 1-2 ounces of apple juice or take a glucose tablet.   The morning of your

## 2018-11-21 NOTE — PROGRESS NOTES
DR KAUFFMAN NOTIFIED OF ABNORMAL EKG DONE AT [address] [place] [address]  MEDICAL CLEARANCE NEEDED FOR OR. Taty Dose AT [address] [place] [address] Dr NOTIFIED AND DR JAIMES'S OFFICE NOTIFIED. CHEST XRAY, EKG, CBC, BMP, PT INR RESULTS FROM [facility] FAXED TO DR JAIMES'S OFFICE. INSTRUCTIONS FAXED AND RECEIVED BY MUSA AT  [address] [place] [address]

## 2018-11-26 ENCOUNTER — ANESTHESIA EVENT (OUTPATIENT)
Dept: OPERATING ROOM | Age: 73
DRG: 460 | End: 2018-11-26
Payer: MEDICARE
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
## 2018-11-27 ENCOUNTER — APPOINTMENT (OUTPATIENT)
Dept: GENERAL RADIOLOGY | Age: 73
DRG: 460 | End: 2018-11-27
Attending: NEUROLOGICAL SURGERY
Payer: MEDICARE

## 2018-11-27 ENCOUNTER — ANESTHESIA (OUTPATIENT)
Dept: OPERATING ROOM | Age: 73
DRG: 460 | End: 2018-11-27
Payer: MEDICARE

## 2018-11-27 ENCOUNTER — HOSPITAL ENCOUNTER (INPATIENT)
Age: 73
LOS: 3 days | Discharge: OTHER INSTITUTION | DRG: 460 | End: 2018-11-30
Attending: NEUROLOGICAL SURGERY | Admitting: NEUROLOGICAL SURGERY
Payer: MEDICARE

## 2018-11-27 VITALS — DIASTOLIC BLOOD PRESSURE: 75 MMHG | TEMPERATURE: 98.4 F | SYSTOLIC BLOOD PRESSURE: 98 MMHG | OXYGEN SATURATION: 96 %

## 2018-11-27 DIAGNOSIS — M54.40 ACUTE RIGHT-SIDED LOW BACK PAIN WITH SCIATICA, SCIATICA LATERALITY UNSPECIFIED: Primary | ICD-10-CM

## 2018-11-27 PROBLEM — M48.04 THORACIC STENOSIS: Status: ACTIVE | Noted: 2018-11-27

## 2018-11-27 LAB
ABO/RH: NORMAL
ANTIBODY SCREEN: NORMAL
B.E.: -1.4 MMOL/L (ref -3–0)
CARDIOPULMONARY BYPASS: NO
DEVICE: ABNORMAL
HCO3 ARTERIAL: 22.4 MMOL/L (ref 22–26)
HCT,ARTERIAL: 34 % (ref 34–48)
HGB, ARTERIAL: 11.7 G/DL (ref 11.5–15.5)
METER GLUCOSE: 144 MG/DL (ref 74–99)
O2 SATURATION: 98.2 % (ref 92–98.5)
OPERATOR ID: ABNORMAL
PCO2 ARTERIAL: 33.5 MMHG (ref 35–45)
PH BLOOD GAS: 7.43 (ref 7.35–7.45)
PO2 ARTERIAL: 103.9 MMHG (ref 60–80)
POTASSIUM SERPL-SCNC: 2.9 MMOL/L (ref 3.5–5.5)
SOURCE, BLOOD GAS: ABNORMAL

## 2018-11-27 PROCEDURE — 7100000000 HC PACU RECOVERY - FIRST 15 MIN: Performed by: NEUROLOGICAL SURGERY

## 2018-11-27 PROCEDURE — 22842 INSERT SPINE FIXATION DEVICE: CPT | Performed by: NEUROLOGICAL SURGERY

## 2018-11-27 PROCEDURE — 2580000003 HC RX 258

## 2018-11-27 PROCEDURE — 1200000000 HC SEMI PRIVATE

## 2018-11-27 PROCEDURE — 0PB40ZZ EXCISION OF THORACIC VERTEBRA, OPEN APPROACH: ICD-10-PCS | Performed by: NEUROLOGICAL SURGERY

## 2018-11-27 PROCEDURE — 82962 GLUCOSE BLOOD TEST: CPT

## 2018-11-27 PROCEDURE — 6360000002 HC RX W HCPCS: Performed by: ANESTHESIOLOGY

## 2018-11-27 PROCEDURE — 3209999900 FLUORO FOR SURGICAL PROCEDURES

## 2018-11-27 PROCEDURE — 2720000010 HC SURG SUPPLY STERILE: Performed by: NEUROLOGICAL SURGERY

## 2018-11-27 PROCEDURE — 2500000003 HC RX 250 WO HCPCS: Performed by: NEUROLOGICAL SURGERY

## 2018-11-27 PROCEDURE — 2580000003 HC RX 258: Performed by: PHYSICIAN ASSISTANT

## 2018-11-27 PROCEDURE — 22842 INSERT SPINE FIXATION DEVICE: CPT | Performed by: PHYSICIAN ASSISTANT

## 2018-11-27 PROCEDURE — C1713 ANCHOR/SCREW BN/BN,TIS/BN: HCPCS | Performed by: NEUROLOGICAL SURGERY

## 2018-11-27 PROCEDURE — 2580000003 HC RX 258: Performed by: NEUROLOGICAL SURGERY

## 2018-11-27 PROCEDURE — 22610 ARTHRD PST TQ 1NTRSPC THRC: CPT | Performed by: NEUROLOGICAL SURGERY

## 2018-11-27 PROCEDURE — 36415 COLL VENOUS BLD VENIPUNCTURE: CPT

## 2018-11-27 PROCEDURE — 86900 BLOOD TYPING SEROLOGIC ABO: CPT

## 2018-11-27 PROCEDURE — 63016 REMOVE SPINE LAMINA >2 THRC: CPT | Performed by: PHYSICIAN ASSISTANT

## 2018-11-27 PROCEDURE — 6360000002 HC RX W HCPCS

## 2018-11-27 PROCEDURE — 2500000003 HC RX 250 WO HCPCS

## 2018-11-27 PROCEDURE — 3700000000 HC ANESTHESIA ATTENDED CARE: Performed by: NEUROLOGICAL SURGERY

## 2018-11-27 PROCEDURE — 0RG70K1 FUSION OF 2 TO 7 THORACIC VERTEBRAL JOINTS WITH NONAUTOLOGOUS TISSUE SUBSTITUTE, POSTERIOR APPROACH, POSTERIOR COLUMN, OPEN APPROACH: ICD-10-PCS | Performed by: NEUROLOGICAL SURGERY

## 2018-11-27 PROCEDURE — 86850 RBC ANTIBODY SCREEN: CPT

## 2018-11-27 PROCEDURE — 0RG7071 FUSION OF 2 TO 7 THORACIC VERTEBRAL JOINTS WITH AUTOLOGOUS TISSUE SUBSTITUTE, POSTERIOR APPROACH, POSTERIOR COLUMN, OPEN APPROACH: ICD-10-PCS | Performed by: NEUROLOGICAL SURGERY

## 2018-11-27 PROCEDURE — 2709999900 HC NON-CHARGEABLE SUPPLY: Performed by: NEUROLOGICAL SURGERY

## 2018-11-27 PROCEDURE — 22610 ARTHRD PST TQ 1NTRSPC THRC: CPT | Performed by: PHYSICIAN ASSISTANT

## 2018-11-27 PROCEDURE — 22614 ARTHRD PST TQ 1NTRSPC EA ADD: CPT | Performed by: PHYSICIAN ASSISTANT

## 2018-11-27 PROCEDURE — 86901 BLOOD TYPING SEROLOGIC RH(D): CPT

## 2018-11-27 PROCEDURE — 6370000000 HC RX 637 (ALT 250 FOR IP): Performed by: NEUROLOGICAL SURGERY

## 2018-11-27 PROCEDURE — 22614 ARTHRD PST TQ 1NTRSPC EA ADD: CPT | Performed by: NEUROLOGICAL SURGERY

## 2018-11-27 PROCEDURE — 63016 REMOVE SPINE LAMINA >2 THRC: CPT | Performed by: NEUROLOGICAL SURGERY

## 2018-11-27 PROCEDURE — 6360000002 HC RX W HCPCS: Performed by: NEUROLOGICAL SURGERY

## 2018-11-27 PROCEDURE — 3700000001 HC ADD 15 MINUTES (ANESTHESIA): Performed by: NEUROLOGICAL SURGERY

## 2018-11-27 PROCEDURE — 3600000015 HC SURGERY LEVEL 5 ADDTL 15MIN: Performed by: NEUROLOGICAL SURGERY

## 2018-11-27 PROCEDURE — 7100000001 HC PACU RECOVERY - ADDTL 15 MIN: Performed by: NEUROLOGICAL SURGERY

## 2018-11-27 PROCEDURE — 3600000005 HC SURGERY LEVEL 5 BASE: Performed by: NEUROLOGICAL SURGERY

## 2018-11-27 PROCEDURE — 82803 BLOOD GASES ANY COMBINATION: CPT

## 2018-11-27 PROCEDURE — 2700000000 HC OXYGEN THERAPY PER DAY

## 2018-11-27 DEVICE — BONE GRAFT KIT 7510800 INFUSE LARGE II
Type: IMPLANTABLE DEVICE | Status: FUNCTIONAL
Brand: INFUSE® BONE GRAFT

## 2018-11-27 DEVICE — SET SCREW 5440030 4.75 TI NS BRK OFF
Type: IMPLANTABLE DEVICE | Site: SPINE THORACIC | Status: FUNCTIONAL
Brand: CD HORIZON® SPINAL SYSTEM

## 2018-11-27 RX ORDER — METOCLOPRAMIDE HYDROCHLORIDE 5 MG/ML
10 INJECTION INTRAMUSCULAR; INTRAVENOUS EVERY 6 HOURS
Status: DISCONTINUED | OUTPATIENT
Start: 2018-11-27 | End: 2018-11-30 | Stop reason: HOSPADM

## 2018-11-27 RX ORDER — LIDOCAINE HYDROCHLORIDE 20 MG/ML
INJECTION, SOLUTION INFILTRATION; PERINEURAL PRN
Status: DISCONTINUED | OUTPATIENT
Start: 2018-11-27 | End: 2018-11-27 | Stop reason: SDUPTHER

## 2018-11-27 RX ORDER — ONDANSETRON 2 MG/ML
INJECTION INTRAMUSCULAR; INTRAVENOUS
Status: DISPENSED
Start: 2018-11-27 | End: 2018-11-27

## 2018-11-27 RX ORDER — GLYCOPYRROLATE 1 MG/5 ML
SYRINGE (ML) INTRAVENOUS PRN
Status: DISCONTINUED | OUTPATIENT
Start: 2018-11-27 | End: 2018-11-27 | Stop reason: SDUPTHER

## 2018-11-27 RX ORDER — MORPHINE SULFATE 4 MG/ML
4 INJECTION, SOLUTION INTRAMUSCULAR; INTRAVENOUS
Status: DISCONTINUED | OUTPATIENT
Start: 2018-11-27 | End: 2018-11-30 | Stop reason: HOSPADM

## 2018-11-27 RX ORDER — ONDANSETRON 2 MG/ML
4 INJECTION INTRAMUSCULAR; INTRAVENOUS ONCE
Status: COMPLETED | OUTPATIENT
Start: 2018-11-27 | End: 2018-11-27

## 2018-11-27 RX ORDER — LISINOPRIL 20 MG/1
20 TABLET ORAL DAILY
Status: DISCONTINUED | OUTPATIENT
Start: 2018-11-27 | End: 2018-11-28

## 2018-11-27 RX ORDER — ACETAMINOPHEN 325 MG/1
650 TABLET ORAL EVERY 4 HOURS PRN
Status: DISCONTINUED | OUTPATIENT
Start: 2018-11-27 | End: 2018-11-30 | Stop reason: HOSPADM

## 2018-11-27 RX ORDER — SODIUM CHLORIDE 0.9 % (FLUSH) 0.9 %
10 SYRINGE (ML) INJECTION PRN
Status: DISCONTINUED | OUTPATIENT
Start: 2018-11-27 | End: 2018-11-30 | Stop reason: HOSPADM

## 2018-11-27 RX ORDER — POLYETHYLENE GLYCOL 3350 17 G/17G
17 POWDER, FOR SOLUTION ORAL DAILY
Status: DISCONTINUED | OUTPATIENT
Start: 2018-11-27 | End: 2018-11-30 | Stop reason: HOSPADM

## 2018-11-27 RX ORDER — HYDROCHLOROTHIAZIDE 25 MG/1
25 TABLET ORAL DAILY
Status: DISCONTINUED | OUTPATIENT
Start: 2018-11-27 | End: 2018-11-28

## 2018-11-27 RX ORDER — PANTOPRAZOLE SODIUM 40 MG/1
40 TABLET, DELAYED RELEASE ORAL DAILY
Status: DISCONTINUED | OUTPATIENT
Start: 2018-11-27 | End: 2018-11-30 | Stop reason: HOSPADM

## 2018-11-27 RX ORDER — LIDOCAINE HYDROCHLORIDE AND EPINEPHRINE BITARTRATE 20; .01 MG/ML; MG/ML
INJECTION, SOLUTION SUBCUTANEOUS PRN
Status: DISCONTINUED | OUTPATIENT
Start: 2018-11-27 | End: 2018-11-27 | Stop reason: HOSPADM

## 2018-11-27 RX ORDER — BISACODYL 10 MG
10 SUPPOSITORY, RECTAL RECTAL DAILY PRN
Status: DISCONTINUED | OUTPATIENT
Start: 2018-11-27 | End: 2018-11-30 | Stop reason: HOSPADM

## 2018-11-27 RX ORDER — LISINOPRIL AND HYDROCHLOROTHIAZIDE 25; 20 MG/1; MG/1
1 TABLET ORAL DAILY
Status: DISCONTINUED | OUTPATIENT
Start: 2018-11-27 | End: 2018-11-27 | Stop reason: CLARIF

## 2018-11-27 RX ORDER — BACLOFEN 10 MG/1
5 TABLET ORAL NIGHTLY PRN
Status: DISCONTINUED | OUTPATIENT
Start: 2018-11-27 | End: 2018-11-28

## 2018-11-27 RX ORDER — HYDROCODONE BITARTRATE AND ACETAMINOPHEN 5; 325 MG/1; MG/1
2 TABLET ORAL EVERY 4 HOURS PRN
Status: DISCONTINUED | OUTPATIENT
Start: 2018-11-27 | End: 2018-11-30 | Stop reason: HOSPADM

## 2018-11-27 RX ORDER — ACETAMINOPHEN 325 MG/1
650 TABLET ORAL EVERY 4 HOURS PRN
Status: DISCONTINUED | OUTPATIENT
Start: 2018-11-27 | End: 2018-11-27 | Stop reason: SDUPTHER

## 2018-11-27 RX ORDER — ONDANSETRON 2 MG/ML
INJECTION INTRAMUSCULAR; INTRAVENOUS PRN
Status: DISCONTINUED | OUTPATIENT
Start: 2018-11-27 | End: 2018-11-27 | Stop reason: SDUPTHER

## 2018-11-27 RX ORDER — CITALOPRAM 20 MG/1
10 TABLET ORAL DAILY
Status: DISCONTINUED | OUTPATIENT
Start: 2018-11-27 | End: 2018-11-30 | Stop reason: HOSPADM

## 2018-11-27 RX ORDER — SODIUM CHLORIDE 9 MG/ML
INJECTION, SOLUTION INTRAVENOUS CONTINUOUS
Status: DISCONTINUED | OUTPATIENT
Start: 2018-11-27 | End: 2018-11-27

## 2018-11-27 RX ORDER — MORPHINE SULFATE 2 MG/ML
1 INJECTION, SOLUTION INTRAMUSCULAR; INTRAVENOUS EVERY 5 MIN PRN
Status: DISCONTINUED | OUTPATIENT
Start: 2018-11-27 | End: 2018-11-27 | Stop reason: HOSPADM

## 2018-11-27 RX ORDER — LACTULOSE 10 G/15ML
20 SOLUTION ORAL 3 TIMES DAILY
Status: DISCONTINUED | OUTPATIENT
Start: 2018-11-27 | End: 2018-11-30 | Stop reason: HOSPADM

## 2018-11-27 RX ORDER — DIAPER,BRIEF,INFANT-TODD,DISP
EACH MISCELLANEOUS PRN
Status: DISCONTINUED | OUTPATIENT
Start: 2018-11-27 | End: 2018-11-27 | Stop reason: HOSPADM

## 2018-11-27 RX ORDER — ONDANSETRON 2 MG/ML
4 INJECTION INTRAMUSCULAR; INTRAVENOUS EVERY 6 HOURS PRN
Status: DISCONTINUED | OUTPATIENT
Start: 2018-11-27 | End: 2018-11-30 | Stop reason: HOSPADM

## 2018-11-27 RX ORDER — FENTANYL CITRATE 50 UG/ML
INJECTION, SOLUTION INTRAMUSCULAR; INTRAVENOUS PRN
Status: DISCONTINUED | OUTPATIENT
Start: 2018-11-27 | End: 2018-11-27 | Stop reason: SDUPTHER

## 2018-11-27 RX ORDER — ROCURONIUM BROMIDE 10 MG/ML
INJECTION, SOLUTION INTRAVENOUS PRN
Status: DISCONTINUED | OUTPATIENT
Start: 2018-11-27 | End: 2018-11-27 | Stop reason: SDUPTHER

## 2018-11-27 RX ORDER — SODIUM CHLORIDE, SODIUM LACTATE, POTASSIUM CHLORIDE, CALCIUM CHLORIDE 600; 310; 30; 20 MG/100ML; MG/100ML; MG/100ML; MG/100ML
INJECTION, SOLUTION INTRAVENOUS CONTINUOUS PRN
Status: DISCONTINUED | OUTPATIENT
Start: 2018-11-27 | End: 2018-11-27 | Stop reason: SDUPTHER

## 2018-11-27 RX ORDER — PROPOFOL 10 MG/ML
INJECTION, EMULSION INTRAVENOUS PRN
Status: DISCONTINUED | OUTPATIENT
Start: 2018-11-27 | End: 2018-11-27 | Stop reason: SDUPTHER

## 2018-11-27 RX ORDER — MIDAZOLAM HYDROCHLORIDE 1 MG/ML
INJECTION INTRAMUSCULAR; INTRAVENOUS PRN
Status: DISCONTINUED | OUTPATIENT
Start: 2018-11-27 | End: 2018-11-27 | Stop reason: SDUPTHER

## 2018-11-27 RX ORDER — VANCOMYCIN HYDROCHLORIDE 500 MG/10ML
INJECTION, POWDER, LYOPHILIZED, FOR SOLUTION INTRAVENOUS PRN
Status: DISCONTINUED | OUTPATIENT
Start: 2018-11-27 | End: 2018-11-27 | Stop reason: HOSPADM

## 2018-11-27 RX ORDER — SODIUM CHLORIDE 9 MG/ML
INJECTION, SOLUTION INTRAVENOUS CONTINUOUS PRN
Status: DISCONTINUED | OUTPATIENT
Start: 2018-11-27 | End: 2018-11-27 | Stop reason: SDUPTHER

## 2018-11-27 RX ORDER — TIZANIDINE 4 MG/1
4 TABLET ORAL EVERY 8 HOURS PRN
Status: DISCONTINUED | OUTPATIENT
Start: 2018-11-27 | End: 2018-11-30 | Stop reason: HOSPADM

## 2018-11-27 RX ORDER — SODIUM CHLORIDE 0.9 % (FLUSH) 0.9 %
10 SYRINGE (ML) INJECTION EVERY 12 HOURS SCHEDULED
Status: DISCONTINUED | OUTPATIENT
Start: 2018-11-27 | End: 2018-11-30 | Stop reason: HOSPADM

## 2018-11-27 RX ORDER — DEXAMETHASONE SODIUM PHOSPHATE 10 MG/ML
INJECTION, SOLUTION INTRAMUSCULAR; INTRAVENOUS PRN
Status: DISCONTINUED | OUTPATIENT
Start: 2018-11-27 | End: 2018-11-27 | Stop reason: SDUPTHER

## 2018-11-27 RX ORDER — MORPHINE SULFATE 2 MG/ML
2 INJECTION, SOLUTION INTRAMUSCULAR; INTRAVENOUS
Status: DISCONTINUED | OUTPATIENT
Start: 2018-11-27 | End: 2018-11-30 | Stop reason: HOSPADM

## 2018-11-27 RX ORDER — DOCUSATE SODIUM 100 MG/1
200 CAPSULE, LIQUID FILLED ORAL 2 TIMES DAILY
Status: DISCONTINUED | OUTPATIENT
Start: 2018-11-27 | End: 2018-11-30 | Stop reason: HOSPADM

## 2018-11-27 RX ORDER — NEOSTIGMINE METHYLSULFATE 1 MG/ML
INJECTION, SOLUTION INTRAVENOUS PRN
Status: DISCONTINUED | OUTPATIENT
Start: 2018-11-27 | End: 2018-11-27 | Stop reason: SDUPTHER

## 2018-11-27 RX ORDER — HYDROCODONE BITARTRATE AND ACETAMINOPHEN 5; 325 MG/1; MG/1
1 TABLET ORAL EVERY 4 HOURS PRN
Status: DISCONTINUED | OUTPATIENT
Start: 2018-11-27 | End: 2018-11-30 | Stop reason: HOSPADM

## 2018-11-27 RX ORDER — BUPIVACAINE HYDROCHLORIDE 2.5 MG/ML
INJECTION, SOLUTION EPIDURAL; INFILTRATION; INTRACAUDAL PRN
Status: DISCONTINUED | OUTPATIENT
Start: 2018-11-27 | End: 2018-11-27 | Stop reason: HOSPADM

## 2018-11-27 RX ORDER — MORPHINE SULFATE 2 MG/ML
2 INJECTION, SOLUTION INTRAMUSCULAR; INTRAVENOUS EVERY 5 MIN PRN
Status: DISCONTINUED | OUTPATIENT
Start: 2018-11-27 | End: 2018-11-27 | Stop reason: HOSPADM

## 2018-11-27 RX ADMIN — LACTULOSE 20 G: 20 SOLUTION ORAL at 21:29

## 2018-11-27 RX ADMIN — DEXAMETHASONE SODIUM PHOSPHATE 10 MG: 10 INJECTION INTRAMUSCULAR; INTRAVENOUS at 06:52

## 2018-11-27 RX ADMIN — Medication 10 ML: at 14:41

## 2018-11-27 RX ADMIN — Medication 3 MG: at 09:58

## 2018-11-27 RX ADMIN — HYDROCHLOROTHIAZIDE 25 MG: 25 TABLET ORAL at 17:14

## 2018-11-27 RX ADMIN — MORPHINE SULFATE 1 MG: 2 INJECTION, SOLUTION INTRAMUSCULAR; INTRAVENOUS at 12:37

## 2018-11-27 RX ADMIN — GENTAMICIN SULFATE 400 MG: 40 INJECTION, SOLUTION INTRAMUSCULAR; INTRAVENOUS at 16:23

## 2018-11-27 RX ADMIN — DOCUSATE SODIUM 200 MG: 100 CAPSULE, LIQUID FILLED ORAL at 21:28

## 2018-11-27 RX ADMIN — SODIUM CHLORIDE: 9 INJECTION, SOLUTION INTRAVENOUS at 12:46

## 2018-11-27 RX ADMIN — PHENYLEPHRINE HYDROCHLORIDE 100 MCG: 10 INJECTION INTRAVENOUS at 06:47

## 2018-11-27 RX ADMIN — Medication 2 G: at 17:34

## 2018-11-27 RX ADMIN — PHENYLEPHRINE HYDROCHLORIDE 100 MCG: 10 INJECTION INTRAVENOUS at 07:09

## 2018-11-27 RX ADMIN — PHENYLEPHRINE HYDROCHLORIDE 100 MCG: 10 INJECTION INTRAVENOUS at 08:14

## 2018-11-27 RX ADMIN — ONDANSETRON 4 MG: 2 INJECTION INTRAMUSCULAR; INTRAVENOUS at 10:48

## 2018-11-27 RX ADMIN — PANTOPRAZOLE SODIUM 40 MG: 40 TABLET, DELAYED RELEASE ORAL at 17:14

## 2018-11-27 RX ADMIN — MORPHINE SULFATE 4 MG: 4 INJECTION INTRAVENOUS at 14:40

## 2018-11-27 RX ADMIN — CITALOPRAM HYDROBROMIDE 10 MG: 20 TABLET ORAL at 17:13

## 2018-11-27 RX ADMIN — ROCURONIUM BROMIDE 10 MG: 10 INJECTION INTRAVENOUS at 07:23

## 2018-11-27 RX ADMIN — PHENYLEPHRINE HYDROCHLORIDE 100 MCG: 10 INJECTION INTRAVENOUS at 07:03

## 2018-11-27 RX ADMIN — Medication 0.6 MG: at 09:58

## 2018-11-27 RX ADMIN — SODIUM CHLORIDE: 9 INJECTION, SOLUTION INTRAVENOUS at 06:35

## 2018-11-27 RX ADMIN — METOCLOPRAMIDE 10 MG: 5 INJECTION, SOLUTION INTRAMUSCULAR; INTRAVENOUS at 21:31

## 2018-11-27 RX ADMIN — PHENYLEPHRINE HYDROCHLORIDE 100 MCG: 10 INJECTION INTRAVENOUS at 08:39

## 2018-11-27 RX ADMIN — HYDROCODONE BITARTRATE AND ACETAMINOPHEN 2 TABLET: 5; 325 TABLET ORAL at 16:22

## 2018-11-27 RX ADMIN — LISINOPRIL 20 MG: 20 TABLET ORAL at 17:13

## 2018-11-27 RX ADMIN — PHENYLEPHRINE HYDROCHLORIDE 100 MCG: 10 INJECTION INTRAVENOUS at 08:32

## 2018-11-27 RX ADMIN — SODIUM CHLORIDE, POTASSIUM CHLORIDE, SODIUM LACTATE AND CALCIUM CHLORIDE: 600; 310; 30; 20 INJECTION, SOLUTION INTRAVENOUS at 08:31

## 2018-11-27 RX ADMIN — TIZANIDINE 4 MG: 4 TABLET ORAL at 17:13

## 2018-11-27 RX ADMIN — HYDROCODONE BITARTRATE AND ACETAMINOPHEN 2 TABLET: 5; 325 TABLET ORAL at 21:28

## 2018-11-27 RX ADMIN — ONDANSETRON HYDROCHLORIDE 4 MG: 2 INJECTION, SOLUTION INTRAMUSCULAR; INTRAVENOUS at 09:34

## 2018-11-27 RX ADMIN — POLYETHYLENE GLYCOL 3350 17 G: 17 POWDER, FOR SOLUTION ORAL at 17:21

## 2018-11-27 RX ADMIN — LIDOCAINE HYDROCHLORIDE 40 MG: 20 INJECTION, SOLUTION INFILTRATION; PERINEURAL at 06:40

## 2018-11-27 RX ADMIN — SODIUM CHLORIDE: 9 INJECTION, SOLUTION INTRAVENOUS at 06:33

## 2018-11-27 RX ADMIN — FENTANYL CITRATE 100 MCG: 50 INJECTION, SOLUTION INTRAMUSCULAR; INTRAVENOUS at 06:40

## 2018-11-27 RX ADMIN — MORPHINE SULFATE 1 MG: 2 INJECTION, SOLUTION INTRAMUSCULAR; INTRAVENOUS at 12:45

## 2018-11-27 RX ADMIN — ROCURONIUM BROMIDE 50 MG: 10 INJECTION INTRAVENOUS at 06:41

## 2018-11-27 RX ADMIN — FENTANYL CITRATE 50 MCG: 50 INJECTION, SOLUTION INTRAMUSCULAR; INTRAVENOUS at 07:23

## 2018-11-27 RX ADMIN — MIDAZOLAM HYDROCHLORIDE 2 MG: 1 INJECTION, SOLUTION INTRAMUSCULAR; INTRAVENOUS at 06:35

## 2018-11-27 RX ADMIN — PROPOFOL 200 MG: 10 INJECTION, EMULSION INTRAVENOUS at 06:40

## 2018-11-27 RX ADMIN — ROCURONIUM BROMIDE 10 MG: 10 INJECTION INTRAVENOUS at 08:11

## 2018-11-27 RX ADMIN — PHENYLEPHRINE HYDROCHLORIDE 100 MCG: 10 INJECTION INTRAVENOUS at 08:45

## 2018-11-27 ASSESSMENT — PULMONARY FUNCTION TESTS
PIF_VALUE: 31
PIF_VALUE: 26
PIF_VALUE: 30
PIF_VALUE: 26
PIF_VALUE: 27
PIF_VALUE: 29
PIF_VALUE: 26
PIF_VALUE: 29
PIF_VALUE: 35
PIF_VALUE: 26
PIF_VALUE: 31
PIF_VALUE: 34
PIF_VALUE: 26
PIF_VALUE: 27
PIF_VALUE: 32
PIF_VALUE: 24
PIF_VALUE: 32
PIF_VALUE: 31
PIF_VALUE: 30
PIF_VALUE: 31
PIF_VALUE: 26
PIF_VALUE: 30
PIF_VALUE: 27
PIF_VALUE: 26
PIF_VALUE: 24
PIF_VALUE: 29
PIF_VALUE: 33
PIF_VALUE: 28
PIF_VALUE: 30
PIF_VALUE: 26
PIF_VALUE: 34
PIF_VALUE: 26
PIF_VALUE: 30
PIF_VALUE: 26
PIF_VALUE: 32
PIF_VALUE: 32
PIF_VALUE: 27
PIF_VALUE: 29
PIF_VALUE: 26
PIF_VALUE: 32
PIF_VALUE: 26
PIF_VALUE: 14
PIF_VALUE: 31
PIF_VALUE: 28
PIF_VALUE: 26
PIF_VALUE: 30
PIF_VALUE: 30
PIF_VALUE: 32
PIF_VALUE: 30
PIF_VALUE: 31
PIF_VALUE: 29
PIF_VALUE: 30
PIF_VALUE: 24
PIF_VALUE: 26
PIF_VALUE: 32
PIF_VALUE: 31
PIF_VALUE: 26
PIF_VALUE: 32
PIF_VALUE: 9
PIF_VALUE: 1
PIF_VALUE: 27
PIF_VALUE: 31
PIF_VALUE: 30
PIF_VALUE: 29
PIF_VALUE: 27
PIF_VALUE: 31
PIF_VALUE: 29
PIF_VALUE: 3
PIF_VALUE: 1
PIF_VALUE: 28
PIF_VALUE: 27
PIF_VALUE: 30
PIF_VALUE: 27
PIF_VALUE: 33
PIF_VALUE: 23
PIF_VALUE: 26
PIF_VALUE: 27
PIF_VALUE: 26
PIF_VALUE: 27
PIF_VALUE: 30
PIF_VALUE: 26
PIF_VALUE: 31
PIF_VALUE: 32
PIF_VALUE: 28
PIF_VALUE: 38
PIF_VALUE: 31
PIF_VALUE: 27
PIF_VALUE: 26
PIF_VALUE: 30
PIF_VALUE: 29
PIF_VALUE: 27
PIF_VALUE: 22
PIF_VALUE: 32
PIF_VALUE: 30
PIF_VALUE: 1
PIF_VALUE: 27
PIF_VALUE: 30
PIF_VALUE: 34
PIF_VALUE: 27
PIF_VALUE: 27
PIF_VALUE: 29
PIF_VALUE: 32
PIF_VALUE: 27
PIF_VALUE: 29
PIF_VALUE: 28
PIF_VALUE: 26
PIF_VALUE: 33
PIF_VALUE: 3
PIF_VALUE: 26
PIF_VALUE: 29
PIF_VALUE: 1
PIF_VALUE: 24
PIF_VALUE: 26
PIF_VALUE: 32
PIF_VALUE: 27
PIF_VALUE: 29
PIF_VALUE: 29
PIF_VALUE: 27
PIF_VALUE: 30
PIF_VALUE: 30
PIF_VALUE: 26
PIF_VALUE: 26
PIF_VALUE: 8
PIF_VALUE: 28
PIF_VALUE: 8
PIF_VALUE: 29
PIF_VALUE: 30
PIF_VALUE: 32
PIF_VALUE: 30
PIF_VALUE: 26
PIF_VALUE: 28
PIF_VALUE: 26
PIF_VALUE: 29
PIF_VALUE: 26
PIF_VALUE: 29
PIF_VALUE: 30
PIF_VALUE: 26
PIF_VALUE: 19
PIF_VALUE: 30
PIF_VALUE: 30
PIF_VALUE: 24
PIF_VALUE: 28
PIF_VALUE: 25
PIF_VALUE: 26
PIF_VALUE: 30
PIF_VALUE: 26
PIF_VALUE: 32
PIF_VALUE: 22
PIF_VALUE: 11
PIF_VALUE: 3
PIF_VALUE: 31
PIF_VALUE: 29
PIF_VALUE: 36
PIF_VALUE: 27
PIF_VALUE: 28
PIF_VALUE: 27
PIF_VALUE: 26
PIF_VALUE: 26
PIF_VALUE: 29
PIF_VALUE: 25
PIF_VALUE: 26
PIF_VALUE: 26
PIF_VALUE: 29
PIF_VALUE: 3
PIF_VALUE: 28
PIF_VALUE: 39
PIF_VALUE: 28
PIF_VALUE: 30
PIF_VALUE: 24
PIF_VALUE: 29
PIF_VALUE: 26
PIF_VALUE: 29
PIF_VALUE: 26
PIF_VALUE: 26
PIF_VALUE: 31
PIF_VALUE: 30
PIF_VALUE: 30
PIF_VALUE: 27
PIF_VALUE: 26
PIF_VALUE: 30
PIF_VALUE: 26
PIF_VALUE: 27
PIF_VALUE: 28
PIF_VALUE: 28
PIF_VALUE: 31
PIF_VALUE: 33
PIF_VALUE: 27
PIF_VALUE: 24
PIF_VALUE: 26
PIF_VALUE: 27
PIF_VALUE: 26
PIF_VALUE: 26
PIF_VALUE: 29
PIF_VALUE: 3
PIF_VALUE: 22
PIF_VALUE: 29
PIF_VALUE: 27
PIF_VALUE: 34
PIF_VALUE: 28
PIF_VALUE: 28
PIF_VALUE: 26
PIF_VALUE: 26
PIF_VALUE: 27
PIF_VALUE: 33
PIF_VALUE: 26
PIF_VALUE: 31

## 2018-11-27 ASSESSMENT — PAIN DESCRIPTION - DESCRIPTORS
DESCRIPTORS: ACHING;THROBBING;SORE
DESCRIPTORS: ACHING;CONSTANT
DESCRIPTORS: PRESSURE;ACHING;DISCOMFORT
DESCRIPTORS: DISCOMFORT;PRESSURE;THROBBING
DESCRIPTORS: DISCOMFORT;PRESSURE;THROBBING

## 2018-11-27 ASSESSMENT — PAIN DESCRIPTION - PROGRESSION
CLINICAL_PROGRESSION: NOT CHANGED
CLINICAL_PROGRESSION: NOT CHANGED

## 2018-11-27 ASSESSMENT — PAIN DESCRIPTION - PAIN TYPE
TYPE: SURGICAL PAIN

## 2018-11-27 ASSESSMENT — PAIN DESCRIPTION - ORIENTATION
ORIENTATION: MID;LOWER
ORIENTATION: MID;LOWER
ORIENTATION: LOWER;MID

## 2018-11-27 ASSESSMENT — PAIN DESCRIPTION - LOCATION
LOCATION: BACK

## 2018-11-27 ASSESSMENT — LIFESTYLE VARIABLES: SMOKING_STATUS: 0

## 2018-11-27 ASSESSMENT — PAIN SCALES - GENERAL
PAINLEVEL_OUTOF10: 6
PAINLEVEL_OUTOF10: 7
PAINLEVEL_OUTOF10: 7
PAINLEVEL_OUTOF10: 6
PAINLEVEL_OUTOF10: 0
PAINLEVEL_OUTOF10: 7

## 2018-11-27 ASSESSMENT — PAIN - FUNCTIONAL ASSESSMENT: PAIN_FUNCTIONAL_ASSESSMENT: 0-10

## 2018-11-27 ASSESSMENT — PAIN DESCRIPTION - ONSET
ONSET: ON-GOING
ONSET: ON-GOING

## 2018-11-27 ASSESSMENT — PAIN DESCRIPTION - FREQUENCY
FREQUENCY: CONTINUOUS
FREQUENCY: CONTINUOUS

## 2018-11-27 NOTE — ANESTHESIA PRE PROCEDURE
Department of Anesthesiology  Preprocedure Note       Name:  Dawson Hernandez   Age:  72 y.o.  :  1945                                          MRN:  [id number]         Date:  2018      Surgeon: Cleo Ervin):  Betty Flores MD    Procedure: T4-T9  LAMINECTOMY AND FUSION -- NEEDS O-ARM, PLATES, SCREWS, VANESSA TABLE, CELL SAVER, PLATELET GEL - MEDTRONIC (N/A )    Medications prior to admission:   Prior to Admission medications    Medication Sig Start Date End Date Taking? Authorizing Provider   bisacodyl (DULCOLAX) 10 MG suppository Place 10 mg rectally daily as needed for Constipation   Yes Historical Provider, MD   polyethylene glycol (GLYCOLAX) powder Take 17 g by mouth daily   Yes Historical Provider, MD   HYDROcodone-acetaminophen (NORCO) 5-325 MG per tablet Take 1 tablet by mouth every 4 hours as needed for Pain. .   Yes Historical Provider, MD   acetaminophen (TYLENOL) 325 MG tablet Take 650 mg by mouth every 4 hours as needed for Pain or Fever   Yes Historical Provider, MD   lisinopril-hydrochlorothiazide (PRINZIDE;ZESTORETIC) 20-25 MG per tablet Take 1 tablet by mouth daily 18   Historical Provider, MD   omeprazole (PRILOSEC) 20 MG delayed release capsule Take 1 capsule by mouth daily 8/3/18   Historical Provider, MD   tiZANidine (ZANAFLEX) 2 MG tablet Take 2 tablets by mouth every 8 hours as needed (muscle spasms) 18   Betty Flores MD   baclofen (LIORESAL) 10 MG tablet Take 0.5 tablets by mouth nightly  Patient taking differently: Take 5 mg by mouth nightly as needed  11/30/15   Miranda Reed MD   citalopram (CELEXA) 10 MG tablet Take 1 tablet by mouth daily 6/5/15   Miranda Reed MD   Cyanocobalamin (VITAMIN B-12) 50 MCG tablet Take 1 tablet by mouth daily 6/5/15   Miranda Reed MD   docusate sodium (COLACE, DULCOLAX) 100 MG CAPS Take 200 mg by mouth 2 times daily  Patient taking differently: Take 200 mg by mouth 2 times daily as needed  6/5/15   Miranda Reed MD ROM: full  Mouth opening: > = 3 FB Dental:    (+) partials      Pulmonary: breath sounds clear to auscultation      (-) not a current smoker                           Cardiovascular:  Exercise tolerance: good (>4 METS),   (+) hypertension:,         Rhythm: regular  Rate: normal           Beta Blocker:  Not on Beta Blocker         Neuro/Psych:   (+) psychiatric history: stable with treatment             ROS comment: Back pain  Ambulatory dysfunction   Spinal cord injury  Thoracic spinal stenosis   Weakness of both lower extremities   DDD (degenerative disc disease)  Right food drop. Previous lumbar fusion  T4-T9 Stenosis  Currently unable to walk   GI/Hepatic/Renal:   (+) GERD: well controlled,          ROS comment: Obesity. Endo/Other:    (+) blood dyscrasia: anemia, arthritis: OA., .                  ROS comment: States she has no knowledge of anemia. Abdominal:       Abdomen: soft. Vascular: negative vascular ROS. Anesthesia Plan      general     ASA 3       Induction: intravenous. Anesthetic plan and risks discussed with patient. Use of blood products discussed with patient whom consented to blood products. Plan discussed with CRNA and attending. Student Nurse Anesthet, RN   11/27/2018      Patient seen and examined, chart reviewed, agree with above findings. Anesthetic plan (GA, A-Line, possible blood products), risks, benefits, alternatives, and personnel involved discussed with patient and her family. Patient verbalized an understanding and agreed to proceed. Anesthetic plan discussed with care team members and agreed upon.     Kelton Colon DO   11/27/2018  6:29 AM
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
## 2018-11-27 NOTE — PROGRESS NOTES
Pt admitted to same day surgery. Pt alert/oriented x3. Respirations non-labored. Denies chest pain/shortness of breath. Skin warm/dry. Denies rash/open wounds. No distress noted. Side rails x2. Call light in reach. Will continue to monitor. Povidine-iodine 5% nasal antiseptic pre-op prep completed 15 seconds per nare and repeated. 2% CHG pre-op skin prep completed in appropriate order using all 6 cloths:    With 1st cloth, wipe the neck, chest, and abdomen. Scrub inside nd  around the navel/belly-button area.  With 2nd cloth, wipe both arms, starting each with the shoulder  and ending at the fingertips. Be sure to thoroughly wipe the arm  pit area.  With 3rd cloth, wipe the right and left hip followed by the groin. Be sure to wipe folds in the abdominal and groin areas.  With 4th cloth, wipe both legs, starting at the thigh and ending   at the toes. Be sure to thoroughly wipe behind the knees.  With 5th cloth, wipe the back starting at the base of the neck and   Ending at the waist line.  With 6th cloth, wipe the buttocks.           Christina Busch RN

## 2018-11-28 PROBLEM — F32.A DEPRESSION: Status: ACTIVE | Noted: 2018-11-28

## 2018-11-28 LAB
ANION GAP SERPL CALCULATED.3IONS-SCNC: 12 MMOL/L (ref 7–16)
BUN BLDV-MCNC: 23 MG/DL (ref 8–23)
CALCIUM SERPL-MCNC: 8.7 MG/DL (ref 8.6–10.2)
CHLORIDE BLD-SCNC: 100 MMOL/L (ref 98–107)
CO2: 26 MMOL/L (ref 22–29)
CREAT SERPL-MCNC: 0.8 MG/DL (ref 0.5–1)
GFR AFRICAN AMERICAN: >60
GFR NON-AFRICAN AMERICAN: >60 ML/MIN/1.73
GLUCOSE BLD-MCNC: 80 MG/DL (ref 74–99)
HCT VFR BLD CALC: 28.6 % (ref 34–48)
HEMOGLOBIN: 9 G/DL (ref 11.5–15.5)
MCH RBC QN AUTO: 28 PG (ref 26–35)
MCHC RBC AUTO-ENTMCNC: 31.5 % (ref 32–34.5)
MCV RBC AUTO: 88.8 FL (ref 80–99.9)
PDW BLD-RTO: 15.1 FL (ref 11.5–15)
PLATELET # BLD: 269 E9/L (ref 130–450)
PMV BLD AUTO: 11.9 FL (ref 7–12)
POTASSIUM SERPL-SCNC: 4.4 MMOL/L (ref 3.5–5)
RBC # BLD: 3.22 E12/L (ref 3.5–5.5)
SODIUM BLD-SCNC: 138 MMOL/L (ref 132–146)
WBC # BLD: 9.1 E9/L (ref 4.5–11.5)

## 2018-11-28 PROCEDURE — 1200000000 HC SEMI PRIVATE

## 2018-11-28 PROCEDURE — G8988 SELF CARE GOAL STATUS: HCPCS

## 2018-11-28 PROCEDURE — 6370000000 HC RX 637 (ALT 250 FOR IP): Performed by: NEUROLOGICAL SURGERY

## 2018-11-28 PROCEDURE — 2580000003 HC RX 258: Performed by: PHYSICIAN ASSISTANT

## 2018-11-28 PROCEDURE — 2700000000 HC OXYGEN THERAPY PER DAY

## 2018-11-28 PROCEDURE — G8978 MOBILITY CURRENT STATUS: HCPCS

## 2018-11-28 PROCEDURE — 97530 THERAPEUTIC ACTIVITIES: CPT

## 2018-11-28 PROCEDURE — 6360000002 HC RX W HCPCS: Performed by: NEUROLOGICAL SURGERY

## 2018-11-28 PROCEDURE — 6370000000 HC RX 637 (ALT 250 FOR IP): Performed by: PHYSICAL MEDICINE & REHABILITATION

## 2018-11-28 PROCEDURE — 2580000003 HC RX 258: Performed by: NEUROLOGICAL SURGERY

## 2018-11-28 PROCEDURE — 97162 PT EVAL MOD COMPLEX 30 MIN: CPT

## 2018-11-28 PROCEDURE — 2580000003 HC RX 258: Performed by: INTERNAL MEDICINE

## 2018-11-28 PROCEDURE — 36415 COLL VENOUS BLD VENIPUNCTURE: CPT

## 2018-11-28 PROCEDURE — G8987 SELF CARE CURRENT STATUS: HCPCS

## 2018-11-28 PROCEDURE — 80048 BASIC METABOLIC PNL TOTAL CA: CPT

## 2018-11-28 PROCEDURE — 85027 COMPLETE CBC AUTOMATED: CPT

## 2018-11-28 PROCEDURE — 6360000002 HC RX W HCPCS: Performed by: PHYSICIAN ASSISTANT

## 2018-11-28 PROCEDURE — G8979 MOBILITY GOAL STATUS: HCPCS

## 2018-11-28 RX ORDER — NICOTINE POLACRILEX 4 MG
15 LOZENGE BUCCAL PRN
Status: DISCONTINUED | OUTPATIENT
Start: 2018-11-28 | End: 2018-11-29

## 2018-11-28 RX ORDER — 0.9 % SODIUM CHLORIDE 0.9 %
500 INTRAVENOUS SOLUTION INTRAVENOUS ONCE
Status: COMPLETED | OUTPATIENT
Start: 2018-11-28 | End: 2018-11-28

## 2018-11-28 RX ORDER — POTASSIUM CHLORIDE 7.45 MG/ML
10 INJECTION INTRAVENOUS
Status: COMPLETED | OUTPATIENT
Start: 2018-11-28 | End: 2018-11-28

## 2018-11-28 RX ORDER — POTASSIUM CHLORIDE 29.8 MG/ML
40 INJECTION INTRAVENOUS ONCE
Status: DISCONTINUED | OUTPATIENT
Start: 2018-11-28 | End: 2018-11-28 | Stop reason: SDUPTHER

## 2018-11-28 RX ORDER — BACLOFEN 10 MG/1
5 TABLET ORAL 3 TIMES DAILY
Status: DISCONTINUED | OUTPATIENT
Start: 2018-11-28 | End: 2018-11-30 | Stop reason: HOSPADM

## 2018-11-28 RX ORDER — DEXTROSE MONOHYDRATE 50 MG/ML
100 INJECTION, SOLUTION INTRAVENOUS PRN
Status: DISCONTINUED | OUTPATIENT
Start: 2018-11-28 | End: 2018-11-29

## 2018-11-28 RX ORDER — DEXTROSE MONOHYDRATE 25 G/50ML
12.5 INJECTION, SOLUTION INTRAVENOUS PRN
Status: DISCONTINUED | OUTPATIENT
Start: 2018-11-28 | End: 2018-11-29

## 2018-11-28 RX ADMIN — HYDROCHLOROTHIAZIDE 25 MG: 25 TABLET ORAL at 09:02

## 2018-11-28 RX ADMIN — Medication 10 ML: at 13:15

## 2018-11-28 RX ADMIN — HYDROCODONE BITARTRATE AND ACETAMINOPHEN 2 TABLET: 5; 325 TABLET ORAL at 04:35

## 2018-11-28 RX ADMIN — METOCLOPRAMIDE 10 MG: 5 INJECTION, SOLUTION INTRAMUSCULAR; INTRAVENOUS at 21:27

## 2018-11-28 RX ADMIN — LACTULOSE 20 G: 20 SOLUTION ORAL at 13:15

## 2018-11-28 RX ADMIN — DOCUSATE SODIUM 200 MG: 100 CAPSULE, LIQUID FILLED ORAL at 21:27

## 2018-11-28 RX ADMIN — CITALOPRAM HYDROBROMIDE 10 MG: 20 TABLET ORAL at 08:55

## 2018-11-28 RX ADMIN — DOCUSATE SODIUM 200 MG: 100 CAPSULE, LIQUID FILLED ORAL at 08:42

## 2018-11-28 RX ADMIN — Medication 2 G: at 17:20

## 2018-11-28 RX ADMIN — LACTULOSE 20 G: 20 SOLUTION ORAL at 08:56

## 2018-11-28 RX ADMIN — LACTULOSE 20 G: 20 SOLUTION ORAL at 21:18

## 2018-11-28 RX ADMIN — SODIUM CHLORIDE 500 ML: 9 INJECTION, SOLUTION INTRAVENOUS at 10:09

## 2018-11-28 RX ADMIN — BACLOFEN 10 MG: 10 TABLET ORAL at 08:40

## 2018-11-28 RX ADMIN — Medication 2 G: at 08:56

## 2018-11-28 RX ADMIN — POTASSIUM CHLORIDE 10 MEQ: 7.46 INJECTION, SOLUTION INTRAVENOUS at 09:39

## 2018-11-28 RX ADMIN — LISINOPRIL 20 MG: 20 TABLET ORAL at 08:43

## 2018-11-28 RX ADMIN — Medication 10 ML: at 00:00

## 2018-11-28 RX ADMIN — POLYETHYLENE GLYCOL 3350 17 G: 17 POWDER, FOR SOLUTION ORAL at 08:44

## 2018-11-28 RX ADMIN — POTASSIUM CHLORIDE 10 MEQ: 7.46 INJECTION, SOLUTION INTRAVENOUS at 10:49

## 2018-11-28 RX ADMIN — TIZANIDINE 4 MG: 4 TABLET ORAL at 08:42

## 2018-11-28 RX ADMIN — HYDROCODONE BITARTRATE AND ACETAMINOPHEN 2 TABLET: 5; 325 TABLET ORAL at 23:46

## 2018-11-28 RX ADMIN — HYDROCODONE BITARTRATE AND ACETAMINOPHEN 1 TABLET: 5; 325 TABLET ORAL at 17:20

## 2018-11-28 RX ADMIN — Medication 10 ML: at 21:18

## 2018-11-28 RX ADMIN — METOCLOPRAMIDE 10 MG: 5 INJECTION, SOLUTION INTRAMUSCULAR; INTRAVENOUS at 02:49

## 2018-11-28 RX ADMIN — PANTOPRAZOLE SODIUM 40 MG: 40 TABLET, DELAYED RELEASE ORAL at 08:54

## 2018-11-28 RX ADMIN — TIZANIDINE 4 MG: 4 TABLET ORAL at 20:17

## 2018-11-28 RX ADMIN — BACLOFEN 5 MG: 10 TABLET ORAL at 13:15

## 2018-11-28 RX ADMIN — Medication 10 ML: at 08:31

## 2018-11-28 RX ADMIN — BACLOFEN 5 MG: 10 TABLET ORAL at 21:18

## 2018-11-28 RX ADMIN — POTASSIUM CHLORIDE 10 MEQ: 7.46 INJECTION, SOLUTION INTRAVENOUS at 08:34

## 2018-11-28 RX ADMIN — Medication 2 G: at 01:13

## 2018-11-28 RX ADMIN — POTASSIUM CHLORIDE 10 MEQ: 7.46 INJECTION, SOLUTION INTRAVENOUS at 11:57

## 2018-11-28 RX ADMIN — METOCLOPRAMIDE 10 MG: 5 INJECTION, SOLUTION INTRAMUSCULAR; INTRAVENOUS at 08:40

## 2018-11-28 RX ADMIN — METOCLOPRAMIDE 10 MG: 5 INJECTION, SOLUTION INTRAMUSCULAR; INTRAVENOUS at 13:17

## 2018-11-28 ASSESSMENT — PAIN DESCRIPTION - PAIN TYPE
TYPE: SURGICAL PAIN

## 2018-11-28 ASSESSMENT — PAIN DESCRIPTION - FREQUENCY: FREQUENCY: CONTINUOUS

## 2018-11-28 ASSESSMENT — PAIN SCALES - GENERAL
PAINLEVEL_OUTOF10: 0
PAINLEVEL_OUTOF10: 7
PAINLEVEL_OUTOF10: 0
PAINLEVEL_OUTOF10: 7
PAINLEVEL_OUTOF10: 6
PAINLEVEL_OUTOF10: 7

## 2018-11-28 ASSESSMENT — PAIN DESCRIPTION - LOCATION
LOCATION: BACK

## 2018-11-28 ASSESSMENT — PAIN DESCRIPTION - DESCRIPTORS
DESCRIPTORS: ACHING;DISCOMFORT;SORE;PRESSURE
DESCRIPTORS: ACHING;DISCOMFORT;SORE;CONSTANT

## 2018-11-28 ASSESSMENT — PAIN DESCRIPTION - ORIENTATION
ORIENTATION: LOWER;MID
ORIENTATION: LOWER;MID

## 2018-11-28 ASSESSMENT — PAIN DESCRIPTION - ONSET: ONSET: ON-GOING

## 2018-11-28 NOTE — DISCHARGE INSTR - COC
Hypertension I10    Anemia D64.9    Ambulatory dysfunction R26.2    Spinal cord injury [id number]    Thoracic spinal stenosis M48.04    Obesity E66.9    Weakness of both lower extremities R29.898    Debility R53.81    Thoracic stenosis M48.04    Depression F32.9       Isolation/Infection:   Isolation          No Isolation            Nurse Assessment:  Last Vital Signs: BP (!) 116/56   Pulse 84   Temp 100.5 °F (38.1 °C) (Oral)   Resp 18   Ht 5' 7\" (1.702 m)   Wt 220 lb (99.8 kg)   SpO2 96%   BMI 34.46 kg/m²     Last documented pain score (0-10 scale): Pain Level: 7  Last Weight:   Wt Readings from Last 1 Encounters:   11/27/18 220 lb (99.8 kg)     Mental Status:  oriented and alert    IV Access:  - None    Nursing Mobility/ADLs:  Walking   Assisted  Transfer  Assisted  Bathing  Assisted  Dressing  Assisted  Toileting  Assisted  Feeding  Assisted  Med Admin  Assisted  Med Delivery   whole    Wound Care Documentation and Therapy:  Incision 04/29/13 Back Mid (Active)   Number of days: 2039       Incision 04/29/15 Back Upper;Mid (Active)   Number of days: 1309       Incision 11/27/18 Back (Active)   Wound Assessment JEREMY 11/28/2018  8:00 AM   Vanessa-wound Assessment JEREMY 11/28/2018  8:00 AM   Closure JEREMY 11/28/2018  8:00 AM   Drainage Amount None 11/28/2018  8:00 AM   Dressing/Treatment Medipore;Dry dressing 11/28/2018  8:00 AM   Dressing Status Clean;Dry; Intact 11/28/2018  8:00 AM   Number of days: 1        Elimination:  Continence:   · Bowel: Incontinent at times  · Bladder: Incontinent at times  Urinary Catheter: Removal Date 11/30/2018    Colostomy/Ileostomy/Ileal Conduit: No       Date of Last BM: 11/29/2018    Intake/Output Summary (Last 24 hours) at 11/28/18 1623  Last data filed at 11/28/18 1414   Gross per 24 hour   Intake                0 ml   Output             2200 ml   Net            -2200 ml     I/O last 3 completed shifts:  In: -   Out: 2200 [Urine:1875; Drains:325]    Safety Concerns:      At Risk for Falls    Impairments/Disabilities:      Weakness of arms and legs, bilaterally    Nutrition Therapy:  Current Nutrition Therapy:   - Oral Diet:  General    Routes of Feeding: Oral  Liquids: No Restrictions  Daily Fluid Restriction: no  Last Modified Barium Swallow with Video (Video Swallowing Test): not done    Treatments at the Time of Hospital Discharge:   Respiratory Treatments: N/A  Oxygen Therapy:  is not on home oxygen therapy. Ventilator:    - No ventilator support    Rehab Therapies: Physical Therapy and Occupational Therapy  Weight Bearing Status/Restrictions: No weight bearing restirctions  Other Medical Equipment (for information only, NOT a DME order):  wheelchair, walker, bedside commode and braces (CUSTOM TLSO BRACE WHEN GREATER THAN 45 DEGREES)  Other Treatments: SPINAL PRECAUTIONS    Patient's personal belongings (please select all that are sent with patient):  Cell phone    RN SIGNATURE:  Electronically signed by Zuly Lowe RN on 11/30/18 at 2:36 PM    CASE MANAGEMENT/SOCIAL WORK SECTION    Inpatient Status Date: ***    Readmission Risk Assessment Score:  Readmission Risk              Risk of Unplanned Readmission:        12           Discharging to Facility/ Agency   · Name:   · Address:  · Phone:  · Fax:    Dialysis Facility (if applicable)   · Name:  · Address:  · Dialysis Schedule:  · Phone:  · Fax:    / signature: Electronically signed by MANJU Levin on 11/30/2018 at 10:29 AM\    PHYSICIAN SECTION    Prognosis: {Prognosis:8479424977}    Condition at Discharge: [address] Patient Condition:[id number]}    Rehab Potential (if transferring to Rehab): {Prognosis:1294846419}    Recommended Labs or Other Treatments After Discharge: ***    Physician Certification: I certify the above information and transfer of Hood Garcia  is necessary for the continuing treatment of the diagnosis listed and that she requires [place] for less 30 days.

## 2018-11-28 NOTE — PROGRESS NOTES
Toileting dependent  Max A   Bed Mobility  Supine to sit: Max A x2   Sit to supine: Max A x2  Supine to sit: mod A    Sit to supine: mod A    Functional Transfers Sit to stand: Max A x2 hand held with B knees blocked  Stand to sit: Max A x2  Stand pivot: n/t  Mod A with ww   Functional Mobility n/t  tbd   Balance Sitting:     Static:  Max A     Dynamic:max A   Standing: max A      Activity Tolerance fair  good   Visual/  Perceptual Glasses: yes         Safety Fair+                 good     Hand dominance: L  UE ROM: RUE:  WFL  LUE:  WFL  Strength: RUE:  4+/5 LUE:  4+/5   Strength: B WFL  Fine Motor Coordination:  WFL    Hearing: WFL  Sensation: decreased in B feet   Tone: increased in LLE UE WFL  Edema: none noted                            Comments/Treatment: Upon arrival, patient supine with TLSO on and daughter in room. OT evaluation performed. spinal precaution with bed mobility and LE dressing and bathing reviewed. At end of session, patient supine with HOB elevated and  with call light and phone within reach, all lines and tubes intact. OT for functional assessment of  ADL, Functional Transfer/Mobility Training, Equipment Needs, Energy Conservation Techniques, Pt/Family Education, Ther Ex- deep breathing for edema and pain control., OT role and POC reviewed. Patient and daughter demo good- understanding of functional limitations, precautions and safety. Pt would benefit from continued skilled OT to increase functional independence and quality of life.     Eval Complexity: mod     Assessment of current deficits   Functional mobility [x]  ADLs [x] Strength [x]  Cognition []  Functional transfers  [x] IADLs [x] Safety Awareness [x]  Endurance [x]  Fine Motor Coordination [] Balance [x] Vision/perception [] Sensation [x]   Gross Motor Coordination [x] ROM [x] Delirium []                  Motor Control [x]    Plan of Care:   ADL retraining [x]   Equipment needs [x]   Neuromuscular re-education [] Energy

## 2018-11-28 NOTE — PROGRESS NOTES
Department of Neurosurgery  Progress Note    CHIEF COMPLAINT: pt s/p thoracic fusion/laminectomy    SUBJECTIVE:  Pt dimitri op site pain well. Leg weakness persists    REVIEW OF SYSTEMS :  Constitutional: Negative for chills and fever. Neurological: Negative for dizziness, tremors and speech change. OBJECTIVE:   VITALS:  /66   Pulse 80   Temp 98.6 °F (37 °C) (Oral)   Resp 16   Ht 5' 7\" (1.702 m)   Wt 220 lb (99.8 kg)   SpO2 97%   BMI 34.46 kg/m²   PHYSICAL:  CONSTITUTIONAL:  awake, alert, cooperative, no apparent distress, and appears stated age. B/l LE 1-2/5.   Drain intact    DATA:  CBC:   Lab Results   Component Value Date    WBC 6.4 10/18/2018    RBC 4.16 10/18/2018    HGB 11.4 10/18/2018    HCT 36.7 10/18/2018    MCV 88.2 10/18/2018    MCH 27.4 10/18/2018    MCHC 31.1 10/18/2018    RDW 15.3 10/18/2018     11/12/2018    MPV 12.1 10/18/2018     BMP:    Lab Results   Component Value Date     10/18/2018    K 2.9 11/27/2018     10/18/2018    CO2 24 10/18/2018    BUN 19 10/18/2018    LABALBU 4.0 10/16/2018    CREATININE 0.7 10/18/2018    CALCIUM 9.2 10/18/2018    GFRAA >60 10/18/2018    LABGLOM >60 10/18/2018    GLUCOSE 102 10/18/2018     PT/INR:    Lab Results   Component Value Date    PROTIME 13.5 11/12/2018    INR 1.2 11/12/2018     PTT:    Lab Results   Component Value Date    APTT 24.9 04/28/2015   [APTT}    Current Inpatient Medications  Current Facility-Administered Medications: sodium chloride flush 0.9 % injection 10 mL, 10 mL, Intravenous, 2 times per day  sodium chloride flush 0.9 % injection 10 mL, 10 mL, Intravenous, PRN  baclofen (LIORESAL) tablet 5 mg, 5 mg, Oral, Nightly PRN  bisacodyl (DULCOLAX) suppository 10 mg, 10 mg, Rectal, Daily PRN  citalopram (CELEXA) tablet 10 mg, 10 mg, Oral, Daily  docusate sodium (COLACE) capsule 200 mg, 200 mg, Oral, BID  pantoprazole (PROTONIX) tablet 40 mg, 40 mg, Oral, Daily  polyethylene glycol (GLYCOLAX) packet 17 g, 17 g, Oral,

## 2018-11-28 NOTE — PROGRESS NOTES
Occupational Therapy          OT consult received and appreciated. Chart reviewed. Will hold evaluation due to holding for TLSO brace delivery . Will evaluate at a later time. Thank you. Angelo Oropeza.  Carolyn 74, Bruce 72

## 2018-11-28 NOTE — CONSULTS
10/17/2018    GLUCOSEU Negative 10/17/2018    AMORPHOUS MODERATE 10/17/2018     HgBA1c:  No results found for: LABA1C  FLP:    Lab Results   Component Value Date    TRIG 94 11/18/2013    HDL 50.0 11/18/2013    LDLCALC 93 11/18/2013     TSH:    Lab Results   Component Value Date    TSH 1.828 11/18/2013       ASSESSMENT:      Active Problems:    Hypertension    Weakness of both lower extremities    Thoracic stenosis    Depression  Resolved Problems:    * No resolved hospital problems. *      PLAN:    72 y.o. female patient  who presents with thoracic spinal stenosis s/p T4-8 posterior thoracic fusion 7/27/2018.       Post op care per primary    Sp IVF bolus for hypotension  D/C lisinopril  Medications for other co morbidities cont as appropriate w dosage adjustments as necessary  Monitor labs   PT/OT  DVT GI PPx  DC planning     Note that over 50 minutes was spent in evaluation of the patient, review of the chart and pertinent records, discussion with family/staff, etc    Gabriel Birmingham MD  8:07 AM  11/28/2018

## 2018-11-29 LAB
ANION GAP SERPL CALCULATED.3IONS-SCNC: 9 MMOL/L (ref 7–16)
BUN BLDV-MCNC: 19 MG/DL (ref 8–23)
CALCIUM SERPL-MCNC: 8.7 MG/DL (ref 8.6–10.2)
CHLORIDE BLD-SCNC: 101 MMOL/L (ref 98–107)
CO2: 28 MMOL/L (ref 22–29)
CREAT SERPL-MCNC: 0.8 MG/DL (ref 0.5–1)
FERRITIN: 189 NG/ML
GFR AFRICAN AMERICAN: >60
GFR NON-AFRICAN AMERICAN: >60 ML/MIN/1.73
GLUCOSE BLD-MCNC: 84 MG/DL (ref 74–99)
HCT VFR BLD CALC: 26.6 % (ref 34–48)
HEMOGLOBIN: 8.3 G/DL (ref 11.5–15.5)
IRON SATURATION: 10 % (ref 15–50)
IRON: 21 MCG/DL (ref 37–145)
MCH RBC QN AUTO: 27.9 PG (ref 26–35)
MCHC RBC AUTO-ENTMCNC: 31.2 % (ref 32–34.5)
MCV RBC AUTO: 89.3 FL (ref 80–99.9)
PDW BLD-RTO: 15.4 FL (ref 11.5–15)
PLATELET # BLD: 267 E9/L (ref 130–450)
PMV BLD AUTO: 11.8 FL (ref 7–12)
POTASSIUM SERPL-SCNC: 4.6 MMOL/L (ref 3.5–5)
RBC # BLD: 2.98 E12/L (ref 3.5–5.5)
SODIUM BLD-SCNC: 138 MMOL/L (ref 132–146)
TOTAL IRON BINDING CAPACITY: 209 MCG/DL (ref 250–450)
WBC # BLD: 9.3 E9/L (ref 4.5–11.5)

## 2018-11-29 PROCEDURE — 6370000000 HC RX 637 (ALT 250 FOR IP): Performed by: NEUROLOGICAL SURGERY

## 2018-11-29 PROCEDURE — 6370000000 HC RX 637 (ALT 250 FOR IP): Performed by: PHYSICAL MEDICINE & REHABILITATION

## 2018-11-29 PROCEDURE — 99024 POSTOP FOLLOW-UP VISIT: CPT | Performed by: PHYSICIAN ASSISTANT

## 2018-11-29 PROCEDURE — 97530 THERAPEUTIC ACTIVITIES: CPT

## 2018-11-29 PROCEDURE — 36415 COLL VENOUS BLD VENIPUNCTURE: CPT

## 2018-11-29 PROCEDURE — 82728 ASSAY OF FERRITIN: CPT

## 2018-11-29 PROCEDURE — 97535 SELF CARE MNGMENT TRAINING: CPT

## 2018-11-29 PROCEDURE — 2580000003 HC RX 258: Performed by: PHYSICIAN ASSISTANT

## 2018-11-29 PROCEDURE — 83540 ASSAY OF IRON: CPT

## 2018-11-29 PROCEDURE — 1200000000 HC SEMI PRIVATE

## 2018-11-29 PROCEDURE — 6360000002 HC RX W HCPCS: Performed by: NEUROLOGICAL SURGERY

## 2018-11-29 PROCEDURE — 85027 COMPLETE CBC AUTOMATED: CPT

## 2018-11-29 PROCEDURE — 2580000003 HC RX 258: Performed by: NEUROLOGICAL SURGERY

## 2018-11-29 PROCEDURE — L0486 TLSO RIGIDLINED CUST FAB TWO: HCPCS

## 2018-11-29 PROCEDURE — 83550 IRON BINDING TEST: CPT

## 2018-11-29 PROCEDURE — 80048 BASIC METABOLIC PNL TOTAL CA: CPT

## 2018-11-29 RX ADMIN — Medication 10 ML: at 08:15

## 2018-11-29 RX ADMIN — Medication 2 G: at 02:21

## 2018-11-29 RX ADMIN — CITALOPRAM HYDROBROMIDE 10 MG: 20 TABLET ORAL at 08:12

## 2018-11-29 RX ADMIN — Medication 10 ML: at 20:11

## 2018-11-29 RX ADMIN — HYDROCODONE BITARTRATE AND ACETAMINOPHEN 2 TABLET: 5; 325 TABLET ORAL at 13:43

## 2018-11-29 RX ADMIN — BACLOFEN 5 MG: 10 TABLET ORAL at 08:11

## 2018-11-29 RX ADMIN — DOCUSATE SODIUM 200 MG: 100 CAPSULE, LIQUID FILLED ORAL at 08:11

## 2018-11-29 RX ADMIN — Medication 10 ML: at 02:21

## 2018-11-29 RX ADMIN — BACLOFEN 5 MG: 10 TABLET ORAL at 20:05

## 2018-11-29 RX ADMIN — Medication 2 G: at 18:00

## 2018-11-29 RX ADMIN — PANTOPRAZOLE SODIUM 40 MG: 40 TABLET, DELAYED RELEASE ORAL at 08:11

## 2018-11-29 RX ADMIN — METOCLOPRAMIDE 10 MG: 5 INJECTION, SOLUTION INTRAMUSCULAR; INTRAVENOUS at 02:21

## 2018-11-29 RX ADMIN — HYDROCODONE BITARTRATE AND ACETAMINOPHEN 2 TABLET: 5; 325 TABLET ORAL at 20:08

## 2018-11-29 RX ADMIN — Medication 10 ML: at 08:12

## 2018-11-29 RX ADMIN — POLYETHYLENE GLYCOL 3350 17 G: 17 POWDER, FOR SOLUTION ORAL at 08:11

## 2018-11-29 RX ADMIN — TIZANIDINE 4 MG: 4 TABLET ORAL at 20:04

## 2018-11-29 RX ADMIN — Medication 2 G: at 09:40

## 2018-11-29 RX ADMIN — TIZANIDINE 4 MG: 4 TABLET ORAL at 08:11

## 2018-11-29 RX ADMIN — LACTULOSE 20 G: 20 SOLUTION ORAL at 08:11

## 2018-11-29 RX ADMIN — BACLOFEN 5 MG: 10 TABLET ORAL at 13:41

## 2018-11-29 ASSESSMENT — PAIN DESCRIPTION - PAIN TYPE
TYPE: SURGICAL PAIN
TYPE: ACUTE PAIN;SURGICAL PAIN

## 2018-11-29 ASSESSMENT — PAIN DESCRIPTION - DESCRIPTORS
DESCRIPTORS: ACHING;DISCOMFORT;SORE
DESCRIPTORS: ACHING;DISCOMFORT;SORE

## 2018-11-29 ASSESSMENT — PAIN SCALES - GENERAL
PAINLEVEL_OUTOF10: 4
PAINLEVEL_OUTOF10: 7
PAINLEVEL_OUTOF10: 3
PAINLEVEL_OUTOF10: 7
PAINLEVEL_OUTOF10: 0

## 2018-11-29 ASSESSMENT — PAIN DESCRIPTION - LOCATION
LOCATION: BACK
LOCATION: BACK

## 2018-11-29 ASSESSMENT — PAIN DESCRIPTION - ORIENTATION
ORIENTATION: LOWER
ORIENTATION: MID;LOWER

## 2018-11-29 NOTE — PROGRESS NOTES
Subjective:  Feeling better No CP, SOB, F, V, D, P     Objective:    /62   Pulse 81   Temp 98.9 °F (37.2 °C) (Temporal)   Resp 14   Ht 5' 7\" (1.702 m)   Wt 220 lb (99.8 kg)   SpO2 93%   BMI 34.46 kg/m²     24HR INTAKE/OUTPUT:    Intake/Output Summary (Last 24 hours) at 11/29/18 0858  Last data filed at 11/29/18 0636   Gross per 24 hour   Intake                0 ml   Output             3860 ml   Net            -3860 ml     nad  Heart:  RRR, no murmurs, gallops, or rubs. Lungs:  CTA bilaterally, no wheeze, rales or rhonchi  Abd: bowel sounds present, nontender, nondistended, no masses  Extrem:  No clubbing, cyanosis, or edema    Most Recent Labs  Lab Results   Component Value Date    WBC 9.3 11/29/2018    HGB 8.3 (L) 11/29/2018    HCT 26.6 (L) 11/29/2018     11/29/2018     11/29/2018    K 4.6 11/29/2018     11/29/2018    CREATININE 0.8 11/29/2018    BUN 19 11/29/2018    CO2 28 11/29/2018    GLUCOSE 84 11/29/2018    ALT 15 10/16/2018    AST 26 10/16/2018    INR 1.2 11/12/2018    TSH 1.828 11/18/2013       FLUORO FOR SURGICAL PROCEDURES   Final Result   Fusion of the thoracic spine                Assessment    Principal Problem:    Thoracic stenosis  Active Problems:    Hypertension    Weakness of both lower extremities    Depression  Resolved Problems:    * No resolved hospital problems.  *      Plan:    72 y.o. female patient  who presents with thoracic spinal stenosis s/p T4-8 posterior thoracic fusion 7/27/2018.       Post op care per primary     BP improved today Sp IVF bolus for hypotension and D/C lisinopril yest    Medications for other co morbidities cont as appropriate w dosage adjustments as necessary    Monitor labs, check iron  PT/OT  DVT GI PPx  DC planning     Electronically signed by Shantelle Quijano MD on 11/29/2018 at 8:58 AM

## 2018-11-29 NOTE — PROGRESS NOTES
Grooming Min A   SBA  While seated  setup    UB Dressing Max A   Max A  To don/doff gown. Dependent to don TLSO. Min A    LB Dressing dependent  Dependent  To don/doff socks while seated EOB. Max A     Bathing dependent Dependent  Per eval  Mod A     Toileting dependent  Dependent Max A   Bed Mobility  Supine to sit: Max A x2   Sit to supine: Max A x2  Max A x 2- supine<->sit  Educated log rolling technique Supine to sit: mod A    Sit to supine: mod A    Functional Transfers Sit to stand: Max A x2 hand held with B knees blocked  Stand to sit: Max A x2  Stand pivot: n/t  Max A x 2- sit<->stand  Cuing on hand placement and body mechanics. Mod A with ww   Functional Mobility n/t N/T  tbd   Balance Sitting:     Static:  Max A     Dynamic:max A   Standing: max A  Sitting:     Static:  Max A     Dynamic:Max A   Standing: Max A x 2      Activity Tolerance fair  Fair good   Visual/  Perceptual Glasses: yes           Safety Fair+  Fair                good         Comments: Upon arrival pt supine in bed. Pt educated on adaptive techniques to increase independence and safety during ADL's, bed mobility, and functional transfers while maintaining precautions. At end of session pt left seated upright in bed, call light within reach. · Pt has made limited progress towards set goals.      · Continue with current plan of care    Time in: 2:40  Time out: 3:05  Total Tx Time: 25 mins    Tad Patterson

## 2018-11-29 NOTE — PLAN OF CARE
Problem: Falls - Risk of:  Goal: Will remain free from falls  Will remain free from falls   Outcome: Met This Shift      Problem: Risk for Impaired Skin Integrity  Goal: Tissue integrity - skin and mucous membranes  Structural intactness and normal physiological function of skin and  mucous membranes.    Outcome: Met This Shift      Problem: Infection - Surgical Site:  Goal: Will show no infection signs and symptoms  Will show no infection signs and symptoms   Outcome: Met This Shift

## 2018-11-29 NOTE — PLAN OF CARE
Problem: Falls - Risk of:  Goal: Will remain free from falls  Will remain free from falls   Outcome: Met This Shift    Goal: Absence of physical injury  Absence of physical injury   Outcome: Met This Shift      Problem: Pain - Acute:  Goal: Pain level will decrease  Pain level will decrease   Outcome: Met This Shift

## 2018-11-30 VITALS
BODY MASS INDEX: 34.53 KG/M2 | DIASTOLIC BLOOD PRESSURE: 69 MMHG | RESPIRATION RATE: 16 BRPM | HEART RATE: 75 BPM | HEIGHT: 67 IN | TEMPERATURE: 98.5 F | SYSTOLIC BLOOD PRESSURE: 152 MMHG | OXYGEN SATURATION: 95 % | WEIGHT: 220 LBS

## 2018-11-30 PROCEDURE — 97110 THERAPEUTIC EXERCISES: CPT

## 2018-11-30 PROCEDURE — 6370000000 HC RX 637 (ALT 250 FOR IP): Performed by: NEUROLOGICAL SURGERY

## 2018-11-30 PROCEDURE — 6370000000 HC RX 637 (ALT 250 FOR IP): Performed by: PHYSICAL MEDICINE & REHABILITATION

## 2018-11-30 PROCEDURE — 2580000003 HC RX 258: Performed by: PHYSICIAN ASSISTANT

## 2018-11-30 PROCEDURE — 2580000003 HC RX 258: Performed by: NEUROLOGICAL SURGERY

## 2018-11-30 PROCEDURE — 6360000002 HC RX W HCPCS: Performed by: NEUROLOGICAL SURGERY

## 2018-11-30 RX ORDER — PANTOPRAZOLE SODIUM 40 MG/1
40 TABLET, DELAYED RELEASE ORAL DAILY
Status: CANCELLED | OUTPATIENT
Start: 2018-12-01

## 2018-11-30 RX ORDER — DOCUSATE SODIUM 100 MG/1
200 CAPSULE, LIQUID FILLED ORAL 2 TIMES DAILY
Status: CANCELLED | OUTPATIENT
Start: 2018-11-30

## 2018-11-30 RX ORDER — HYDROCODONE BITARTRATE AND ACETAMINOPHEN 5; 325 MG/1; MG/1
1 TABLET ORAL EVERY 4 HOURS PRN
Status: CANCELLED | OUTPATIENT
Start: 2018-11-30

## 2018-11-30 RX ORDER — BISACODYL 10 MG
10 SUPPOSITORY, RECTAL RECTAL DAILY PRN
Status: CANCELLED | OUTPATIENT
Start: 2018-11-30

## 2018-11-30 RX ORDER — ONDANSETRON 2 MG/ML
4 INJECTION INTRAMUSCULAR; INTRAVENOUS EVERY 6 HOURS PRN
Status: CANCELLED | OUTPATIENT
Start: 2018-11-30

## 2018-11-30 RX ORDER — HYDROCODONE BITARTRATE AND ACETAMINOPHEN 5; 325 MG/1; MG/1
2 TABLET ORAL EVERY 4 HOURS PRN
Status: CANCELLED | OUTPATIENT
Start: 2018-11-30

## 2018-11-30 RX ORDER — TIZANIDINE 4 MG/1
4 TABLET ORAL EVERY 8 HOURS PRN
Status: CANCELLED | OUTPATIENT
Start: 2018-11-30

## 2018-11-30 RX ORDER — HYDROCODONE BITARTRATE AND ACETAMINOPHEN 5; 325 MG/1; MG/1
1 TABLET ORAL EVERY 4 HOURS PRN
Qty: 42 TABLET | Refills: 0 | Status: SHIPPED | OUTPATIENT
Start: 2018-11-30 | End: 2018-12-07

## 2018-11-30 RX ORDER — LACTULOSE 10 G/15ML
20 SOLUTION ORAL 3 TIMES DAILY
Status: CANCELLED | OUTPATIENT
Start: 2018-11-30

## 2018-11-30 RX ORDER — ACETAMINOPHEN 325 MG/1
650 TABLET ORAL EVERY 4 HOURS PRN
Status: CANCELLED | OUTPATIENT
Start: 2018-11-30

## 2018-11-30 RX ORDER — CITALOPRAM 20 MG/1
10 TABLET ORAL DAILY
Status: CANCELLED | OUTPATIENT
Start: 2018-12-01

## 2018-11-30 RX ORDER — BACLOFEN 10 MG/1
5 TABLET ORAL 3 TIMES DAILY
Status: CANCELLED | OUTPATIENT
Start: 2018-11-30

## 2018-11-30 RX ORDER — METOCLOPRAMIDE HYDROCHLORIDE 5 MG/ML
10 INJECTION INTRAMUSCULAR; INTRAVENOUS EVERY 6 HOURS
Status: CANCELLED | OUTPATIENT
Start: 2018-11-30

## 2018-11-30 RX ORDER — TIZANIDINE 4 MG/1
4 TABLET ORAL EVERY 6 HOURS PRN
Qty: 40 TABLET | Refills: 0 | Status: SHIPPED | OUTPATIENT
Start: 2018-11-30

## 2018-11-30 RX ADMIN — Medication 10 ML: at 02:35

## 2018-11-30 RX ADMIN — METOCLOPRAMIDE 10 MG: 5 INJECTION, SOLUTION INTRAMUSCULAR; INTRAVENOUS at 13:32

## 2018-11-30 RX ADMIN — DOCUSATE SODIUM 200 MG: 100 CAPSULE, LIQUID FILLED ORAL at 08:46

## 2018-11-30 RX ADMIN — Medication 10 ML: at 08:47

## 2018-11-30 RX ADMIN — METOCLOPRAMIDE 10 MG: 5 INJECTION, SOLUTION INTRAMUSCULAR; INTRAVENOUS at 02:35

## 2018-11-30 RX ADMIN — Medication 2 G: at 10:00

## 2018-11-30 RX ADMIN — BACLOFEN 5 MG: 10 TABLET ORAL at 08:46

## 2018-11-30 RX ADMIN — Medication 2 G: at 02:30

## 2018-11-30 RX ADMIN — PANTOPRAZOLE SODIUM 40 MG: 40 TABLET, DELAYED RELEASE ORAL at 08:46

## 2018-11-30 RX ADMIN — HYDROCODONE BITARTRATE AND ACETAMINOPHEN 2 TABLET: 5; 325 TABLET ORAL at 08:46

## 2018-11-30 RX ADMIN — BACLOFEN 5 MG: 10 TABLET ORAL at 13:31

## 2018-11-30 RX ADMIN — Medication 10 ML: at 08:46

## 2018-11-30 RX ADMIN — HYDROCODONE BITARTRATE AND ACETAMINOPHEN 2 TABLET: 5; 325 TABLET ORAL at 17:09

## 2018-11-30 RX ADMIN — CITALOPRAM HYDROBROMIDE 10 MG: 20 TABLET ORAL at 08:46

## 2018-11-30 ASSESSMENT — PAIN DESCRIPTION - PAIN TYPE
TYPE: SURGICAL PAIN
TYPE: ACUTE PAIN;SURGICAL PAIN

## 2018-11-30 ASSESSMENT — PAIN SCALES - GENERAL
PAINLEVEL_OUTOF10: 2
PAINLEVEL_OUTOF10: 8
PAINLEVEL_OUTOF10: 7
PAINLEVEL_OUTOF10: 5

## 2018-11-30 ASSESSMENT — PAIN DESCRIPTION - ORIENTATION
ORIENTATION: MID;LOWER
ORIENTATION: LOWER

## 2018-11-30 ASSESSMENT — PAIN DESCRIPTION - FREQUENCY: FREQUENCY: CONTINUOUS

## 2018-11-30 ASSESSMENT — PAIN DESCRIPTION - DESCRIPTORS
DESCRIPTORS: ACHING;DISCOMFORT;SORE
DESCRIPTORS: ACHING;DISCOMFORT;SORE

## 2018-11-30 ASSESSMENT — PAIN DESCRIPTION - LOCATION
LOCATION: BACK
LOCATION: BACK

## 2018-11-30 ASSESSMENT — PAIN DESCRIPTION - ONSET: ONSET: ON-GOING

## 2018-11-30 NOTE — PLAN OF CARE
Problem: Risk for Impaired Skin Integrity  Goal: Tissue integrity - skin and mucous membranes  Structural intactness and normal physiological function of skin and  mucous membranes.    Outcome: Met This Shift      Problem: Infection - Surgical Site:  Goal: Will show no infection signs and symptoms  Will show no infection signs and symptoms   Outcome: Met This Shift      Problem: Pain - Acute:  Goal: Pain level will decrease  Pain level will decrease   Outcome: Met This Shift

## 2018-11-30 NOTE — PROGRESS NOTES
Subjective:  Feeling better No CP, SOB, F, V, D, P     Objective:    BP (!) 149/67   Pulse 86   Temp 98.7 °F (37.1 °C) (Temporal)   Resp 14   Ht 5' 7\" (1.702 m)   Wt 220 lb (99.8 kg)   SpO2 95%   BMI 34.46 kg/m²     24HR INTAKE/OUTPUT:      Intake/Output Summary (Last 24 hours) at 11/30/18 1252  Last data filed at 11/30/18 0854   Gross per 24 hour   Intake                0 ml   Output             1660 ml   Net            -1660 ml     nad  Heart:  RRR, no murmurs, gallops, or rubs. Lungs:  CTA bilaterally, no wheeze, rales or rhonchi  Abd: bowel sounds present, nontender, nondistended, no masses  Extrem:  No clubbing, cyanosis, or edema    Most Recent Labs  Lab Results   Component Value Date    WBC 9.3 11/29/2018    HGB 8.3 (L) 11/29/2018    HCT 26.6 (L) 11/29/2018     11/29/2018     11/29/2018    K 4.6 11/29/2018     11/29/2018    CREATININE 0.8 11/29/2018    BUN 19 11/29/2018    CO2 28 11/29/2018    GLUCOSE 84 11/29/2018    ALT 15 10/16/2018    AST 26 10/16/2018    INR 1.2 11/12/2018    TSH 1.828 11/18/2013       [facility] FOR SURGICAL PROCEDURES   Final Result   Fusion of the thoracic spine                Assessment    Principal Problem:    Thoracic stenosis  Active Problems:    Hypertension    Weakness of both lower extremities    Depression  Resolved Problems:    * No resolved hospital problems.  *      Plan:    72 y.o. female patient  who presents with thoracic spinal stenosis s/p T4-8 posterior thoracic fusion 7/27/2018.       Post op care per primary     BP improved today Sp IVF bolus for hypotension and D/C lisinopril yest    Medications for other co morbidities cont as appropriate w dosage adjustments as necessary    For rehab today    Electronically signed by Radha Bell MD on 11/30/2018 at 12:52 PM

## 2018-11-30 NOTE — CARE COORDINATION
11/30/2018 social work transition of care  Sw notified that patient to admit to acute rehab today.   Electronically signed by MANJU Lees on 11/30/2018 at 10:30 AM

## 2018-11-30 NOTE — PROGRESS NOTES
Department of Neurosurgery  Progress Note    CHIEF COMPLAINT: pt s/p thoracic fusion/laminectomy    SUBJECTIVE:  Pt dimitri op site pain well. Leg weakness persists but improving    REVIEW OF SYSTEMS :  Constitutional: Negative for chills and fever. Neurological: Negative for dizziness, tremors and speech change. OBJECTIVE:   VITALS:  BP (!) 149/67   Pulse 86   Temp 98.7 °F (37.1 °C) (Temporal)   Resp 14   Ht 5' 7\" (1.702 m)   Wt 220 lb (99.8 kg)   SpO2 95%   BMI 34.46 kg/m²   PHYSICAL:  CONSTITUTIONAL:  awake, alert, cooperative, no apparent distress, and appears stated age. B/l LE 2/5.   Drain intact    DATA:  CBC:   Lab Results   Component Value Date    WBC 9.3 11/29/2018    RBC 2.98 11/29/2018    HGB 8.3 11/29/2018    HCT 26.6 11/29/2018    MCV 89.3 11/29/2018    MCH 27.9 11/29/2018    MCHC 31.2 11/29/2018    RDW 15.4 11/29/2018     11/29/2018    MPV 11.8 11/29/2018     BMP:    Lab Results   Component Value Date     11/29/2018    K 4.6 11/29/2018     11/29/2018    CO2 28 11/29/2018    BUN 19 11/29/2018    LABALBU 4.0 10/16/2018    CREATININE 0.8 11/29/2018    CALCIUM 8.7 11/29/2018    GFRAA >60 11/29/2018    LABGLOM >60 11/29/2018    GLUCOSE 84 11/29/2018     PT/INR:    Lab Results   Component Value Date    PROTIME 13.5 11/12/2018    INR 1.2 11/12/2018     PTT:    Lab Results   Component Value Date    APTT 24.9 04/28/2015   [APTT}    Current Inpatient Medications  Current Facility-Administered Medications: baclofen (LIORESAL) tablet 5 mg, 5 mg, Oral, TID  sodium chloride flush 0.9 % injection 10 mL, 10 mL, Intravenous, 2 times per day  sodium chloride flush 0.9 % injection 10 mL, 10 mL, Intravenous, PRN  bisacodyl (DULCOLAX) suppository 10 mg, 10 mg, Rectal, Daily PRN  citalopram (CELEXA) tablet 10 mg, 10 mg, Oral, Daily  docusate sodium (COLACE) capsule 200 mg, 200 mg, Oral, BID  pantoprazole (PROTONIX) tablet 40 mg, 40 mg, Oral, Daily  polyethylene glycol (GLYCOLAX) packet 17 g,

## 2018-11-30 NOTE — PROGRESS NOTES
Met with pt regarding ARU. Pt stated that she is planning to dc to [address] care after she would leave ARU and had no help at home. Dr. Felicita Morocho stated that she then needs to go to [address] care at this time, and he will not accept her to ARU. Pt notified of this as well as , Vishal Arreola.

## 2018-11-30 NOTE — CARE COORDINATION
11/30/2018 social work transition of care  Sw was notified by [facility] rep that pt is to return to [facility] at discharge. Sw followed up with pt about returning to [facility] stated that she would like to go to [facility] instead. Sw explained that discharge will not be held for choice change,pt understood that she may have to make change from facility  (sw did make referral to [facility]). Sw will follow and assist prn. Pending discharge today.   Electronically signed by MANJU Levin on 11/30/2018 at 12:35 PM

## 2018-12-05 NOTE — DISCHARGE SUMMARY
[address]                  [address] [facility],  [facility]                               DISCHARGE SUMMARY    PATIENT NAME: Janay Plasencia                  :        1945  MED REC NO:   [id number]                            ROOM:       5216  ACCOUNT NO:   [de-identified]                           ADMIT DATE: 2018  PROVIDER:     PRABHA Mast                  DISCHARGE DATE:  2018    PRINCIPAL DIAGNOSIS:  Thoracic stenosis T4 through T8 with previous T8  to ilium fusion. PROCEDURE:  T4 through T8 thoracic fusion with instrumentation and  bilateral T4, T5, T6, and T7 laminectomies. HISTORY OF PRESENT ILLNESS:  This is a pleasant 82-year-old female who  presented to Dr. Cherie Valentino with significant leg weakness. Thoracic MRI  revealed stenosis that was severe from T4 through T8. After failing  conservative treatment options, the patient opted for surgical  intervention. HOSPITAL COURSE:  The patient tolerated the procedure well without any  complications. Postoperatively, she was eating and voiding well. Her  legs were still weak, but they were improving. She was discharged to  rehab facility here in the hospital for further therapy. She was given  scripts for analgesics and muscle relaxers. She was given instructions  with no strenuous activity; no lifting over 10 pounds; no repetitive  bending, twisting, pushing, or pulling.         Cayla Mascorro    D: 2018 10:53:40       T: 2018 13:25:50     [email or web address]  Job#: [id number]     Doc#: [id number]    CC:

## 2018-12-28 ENCOUNTER — HOSPITAL ENCOUNTER (OUTPATIENT)
Dept: GENERAL RADIOLOGY | Age: 73
Discharge: HOME | End: 2018-12-30
Payer: MEDICARE

## 2018-12-28 ENCOUNTER — OFFICE VISIT (OUTPATIENT)
Dept: NEUROSURGERY | Age: 73
End: 2018-12-28

## 2018-12-28 ENCOUNTER — HOSPITAL ENCOUNTER (OUTPATIENT)
Age: 73
Discharge: HOME | End: 2018-12-30
Payer: MEDICARE

## 2018-12-28 VITALS — DIASTOLIC BLOOD PRESSURE: 70 MMHG | SYSTOLIC BLOOD PRESSURE: 122 MMHG | HEART RATE: 83 BPM

## 2018-12-28 DIAGNOSIS — M48.04 THORACIC STENOSIS: ICD-10-CM

## 2018-12-28 DIAGNOSIS — M48.04 THORACIC SPINAL STENOSIS: Primary | ICD-10-CM

## 2018-12-28 PROCEDURE — 99024 POSTOP FOLLOW-UP VISIT: CPT | Performed by: PHYSICIAN ASSISTANT

## 2018-12-28 PROCEDURE — 72070 X-RAY EXAM THORAC SPINE 2VWS: CPT

## 2019-02-05 DIAGNOSIS — M54.40 ACUTE RIGHT-SIDED LOW BACK PAIN WITH SCIATICA, SCIATICA LATERALITY UNSPECIFIED: Primary | ICD-10-CM

## 2019-02-21 ENCOUNTER — HOSPITAL ENCOUNTER (OUTPATIENT)
Dept: GENERAL RADIOLOGY | Age: 74
Discharge: HOME | End: 2019-02-23
Payer: MEDICARE

## 2019-02-21 ENCOUNTER — HOSPITAL ENCOUNTER (OUTPATIENT)
Age: 74
Discharge: HOME | End: 2019-02-23
Payer: MEDICARE

## 2019-02-21 ENCOUNTER — OFFICE VISIT (OUTPATIENT)
Dept: NEUROSURGERY | Age: 74
End: 2019-02-21

## 2019-02-21 VITALS
DIASTOLIC BLOOD PRESSURE: 67 MMHG | HEIGHT: 67 IN | WEIGHT: 220 LBS | OXYGEN SATURATION: 97 % | BODY MASS INDEX: 34.53 KG/M2 | SYSTOLIC BLOOD PRESSURE: 118 MMHG | HEART RATE: 83 BPM

## 2019-02-21 DIAGNOSIS — M54.40 ACUTE RIGHT-SIDED LOW BACK PAIN WITH SCIATICA, SCIATICA LATERALITY UNSPECIFIED: ICD-10-CM

## 2019-02-21 DIAGNOSIS — M54.16 LUMBAR RADICULOPATHY: Primary | ICD-10-CM

## 2019-02-21 PROCEDURE — 99024 POSTOP FOLLOW-UP VISIT: CPT | Performed by: PHYSICIAN ASSISTANT

## 2019-02-21 PROCEDURE — 72100 X-RAY EXAM L-S SPINE 2/3 VWS: CPT

## 2019-02-21 RX ORDER — HYDROCODONE BITARTRATE AND ACETAMINOPHEN 5; 325 MG/1; MG/1
1 TABLET ORAL EVERY 6 HOURS PRN
COMMUNITY

## 2019-07-12 ENCOUNTER — HOSPITAL ENCOUNTER (OUTPATIENT)
Age: 74
Discharge: HOME | End: 2019-07-12
Payer: MEDICARE

## 2019-07-12 LAB
BACTERIA: NORMAL /HPF
BILIRUBIN URINE: NEGATIVE
BLOOD, URINE: NEGATIVE
CLARITY: CLEAR
COLOR: YELLOW
GLUCOSE URINE: NEGATIVE MG/DL
KETONES, URINE: NEGATIVE MG/DL
LEUKOCYTE ESTERASE, URINE: ABNORMAL
NITRITE, URINE: NEGATIVE
PH UA: 6 (ref 5–9)
PROTEIN UA: NEGATIVE MG/DL
RBC UA: NORMAL /HPF (ref 0–2)
SPECIFIC GRAVITY UA: <=1.005 (ref 1–1.03)
UROBILINOGEN, URINE: 1 E.U./DL
WBC UA: NORMAL /HPF (ref 0–5)

## 2019-07-12 PROCEDURE — 81001 URINALYSIS AUTO W/SCOPE: CPT

## 2019-07-12 PROCEDURE — 87088 URINE BACTERIA CULTURE: CPT

## 2019-07-13 LAB — URINE CULTURE, ROUTINE: NORMAL

## 2019-11-14 DIAGNOSIS — M54.9 BACK PAIN, UNSPECIFIED BACK LOCATION, UNSPECIFIED BACK PAIN LATERALITY, UNSPECIFIED CHRONICITY: Primary | ICD-10-CM

## 2021-06-17 ENCOUNTER — HOSPITAL ENCOUNTER (OUTPATIENT)
Dept: SLEEP CENTER | Age: 76
Discharge: HOME | End: 2021-06-17
Payer: MEDICARE

## 2021-06-17 DIAGNOSIS — E66.9 OBESITY, UNSPECIFIED CLASSIFICATION, UNSPECIFIED OBESITY TYPE, UNSPECIFIED WHETHER SERIOUS COMORBIDITY PRESENT: Primary | ICD-10-CM

## 2021-06-17 DIAGNOSIS — R29.898 WEAKNESS OF BOTH LOWER EXTREMITIES: ICD-10-CM

## 2021-06-17 PROCEDURE — 95811 POLYSOM 6/>YRS CPAP 4/> PARM: CPT

## 2021-06-18 VITALS
HEART RATE: 84 BPM | BODY MASS INDEX: 35.31 KG/M2 | OXYGEN SATURATION: 96 % | WEIGHT: 225 LBS | SYSTOLIC BLOOD PRESSURE: 155 MMHG | DIASTOLIC BLOOD PRESSURE: 72 MMHG | HEIGHT: 67 IN

## 2021-06-18 ASSESSMENT — SLEEP AND FATIGUE QUESTIONNAIRES
HOW LIKELY ARE YOU TO NOD OFF OR FALL ASLEEP IN A CAR, WHILE STOPPED FOR A FEW MINUTES IN TRAFFIC: 0
HOW LIKELY ARE YOU TO NOD OFF OR FALL ASLEEP WHILE WATCHING TV: 2
HOW LIKELY ARE YOU TO NOD OFF OR FALL ASLEEP WHILE SITTING INACTIVE IN A PUBLIC PLACE: 0
HOW LIKELY ARE YOU TO NOD OFF OR FALL ASLEEP WHILE SITTING AND TALKING TO SOMEONE: 0
HOW LIKELY ARE YOU TO NOD OFF OR FALL ASLEEP WHEN YOU ARE A PASSENGER IN A CAR FOR AN HOUR WITHOUT A BREAK: 0
HOW LIKELY ARE YOU TO NOD OFF OR FALL ASLEEP WHILE SITTING AND READING: 2
ESS TOTAL SCORE: 5
HOW LIKELY ARE YOU TO NOD OFF OR FALL ASLEEP WHILE LYING DOWN TO REST IN THE AFTERNOON WHEN CIRCUMSTANCES PERMIT: 0
HOW LIKELY ARE YOU TO NOD OFF OR FALL ASLEEP WHILE SITTING QUIETLY AFTER LUNCH WITHOUT ALCOHOL: 1

## 2021-08-18 ENCOUNTER — HOSPITAL ENCOUNTER (OUTPATIENT)
Age: 76
Discharge: HOME | End: 2021-08-18
Payer: MEDICARE

## 2021-08-18 LAB
ALBUMIN SERPL-MCNC: 4.3 G/DL (ref 3.5–5.2)
ALP BLD-CCNC: 79 U/L (ref 35–104)
ALT SERPL-CCNC: 13 U/L (ref 0–32)
ANION GAP SERPL CALCULATED.3IONS-SCNC: 11 MMOL/L (ref 7–16)
AST SERPL-CCNC: 14 U/L (ref 0–31)
BILIRUB SERPL-MCNC: 0.6 MG/DL (ref 0–1.2)
BUN BLDV-MCNC: 16 MG/DL (ref 6–23)
CALCIUM SERPL-MCNC: 9.3 MG/DL (ref 8.6–10.2)
CHLORIDE BLD-SCNC: 104 MMOL/L (ref 98–107)
CHOLESTEROL, TOTAL: 140 MG/DL (ref 0–199)
CO2: 28 MMOL/L (ref 22–29)
CREAT SERPL-MCNC: 0.6 MG/DL (ref 0.5–1)
GFR AFRICAN AMERICAN: >60
GFR NON-AFRICAN AMERICAN: >60 ML/MIN/1.73
GLUCOSE BLD-MCNC: 81 MG/DL (ref 74–99)
HCT VFR BLD CALC: 38.3 % (ref 34–48)
HDLC SERPL-MCNC: 48 MG/DL
HEMOGLOBIN: 12.3 G/DL (ref 11.5–15.5)
LDL CHOLESTEROL CALCULATED: 72 MG/DL (ref 0–99)
MCH RBC QN AUTO: 27.6 PG (ref 26–35)
MCHC RBC AUTO-ENTMCNC: 32.1 % (ref 32–34.5)
MCV RBC AUTO: 86.1 FL (ref 80–99.9)
PDW BLD-RTO: 14.8 FL (ref 11.5–15)
PLATELET # BLD: 262 E9/L (ref 130–450)
PMV BLD AUTO: 12.3 FL (ref 7–12)
POTASSIUM SERPL-SCNC: 4.3 MMOL/L (ref 3.5–5)
RBC # BLD: 4.45 E12/L (ref 3.5–5.5)
SODIUM BLD-SCNC: 143 MMOL/L (ref 132–146)
TOTAL PROTEIN: 7 G/DL (ref 6.4–8.3)
TRIGL SERPL-MCNC: 100 MG/DL (ref 0–149)
VITAMIN D 25-HYDROXY: 36 NG/ML (ref 30–100)
VLDLC SERPL CALC-MCNC: 20 MG/DL
WBC # BLD: 5.2 E9/L (ref 4.5–11.5)

## 2021-08-18 PROCEDURE — 36415 COLL VENOUS BLD VENIPUNCTURE: CPT

## 2021-08-18 PROCEDURE — 80061 LIPID PANEL: CPT

## 2021-08-18 PROCEDURE — 85027 COMPLETE CBC AUTOMATED: CPT

## 2021-08-18 PROCEDURE — 82306 VITAMIN D 25 HYDROXY: CPT

## 2021-08-18 PROCEDURE — 80053 COMPREHEN METABOLIC PANEL: CPT

## 2021-09-03 LAB — WOUND/ABSCESS: NORMAL

## 2022-08-15 ENCOUNTER — HOSPITAL ENCOUNTER (OUTPATIENT)
Dept: SLEEP CENTER | Age: 77
Discharge: HOME | End: 2022-08-15
Payer: MEDICARE

## 2022-08-15 PROCEDURE — G0399 HOME SLEEP TEST/TYPE 3 PORTA: HCPCS

## 2022-08-27 NOTE — PROGRESS NOTES
with underlying respiratory events. Considering the above findings, the patient will benefit of CPAP  titration.         Malick Clayton MD    D: 08/26/2022 12:32:57       T: 08/26/2022 13:16:45     [email or web address]  Job#: [id number]     Doc#: [id number]    CC:

## 2022-11-30 ENCOUNTER — HOSPITAL ENCOUNTER (OUTPATIENT)
Age: 77
Discharge: HOME | End: 2022-11-30
Payer: MEDICARE

## 2022-11-30 LAB
ALBUMIN SERPL-MCNC: 4.5 G/DL (ref 3.5–5.2)
ALP BLD-CCNC: 91 U/L (ref 35–104)
ALT SERPL-CCNC: 12 U/L (ref 0–32)
ANION GAP SERPL CALCULATED.3IONS-SCNC: 11 MMOL/L (ref 7–16)
AST SERPL-CCNC: 13 U/L (ref 0–31)
BASOPHILS ABSOLUTE: 0.02 E9/L (ref 0–0.2)
BASOPHILS RELATIVE PERCENT: 0.3 % (ref 0–2)
BILIRUB SERPL-MCNC: 0.6 MG/DL (ref 0–1.2)
BUN BLDV-MCNC: 24 MG/DL (ref 6–23)
CALCIUM SERPL-MCNC: 9.8 MG/DL (ref 8.6–10.2)
CHLORIDE BLD-SCNC: 102 MMOL/L (ref 98–107)
CHOLESTEROL, TOTAL: 173 MG/DL (ref 0–199)
CO2: 26 MMOL/L (ref 22–29)
CREAT SERPL-MCNC: 0.6 MG/DL (ref 0.5–1)
EOSINOPHILS ABSOLUTE: 0.12 E9/L (ref 0.05–0.5)
EOSINOPHILS RELATIVE PERCENT: 1.8 % (ref 0–6)
GFR SERPL CREATININE-BSD FRML MDRD: >60 ML/MIN/1.73
GLUCOSE BLD-MCNC: 85 MG/DL (ref 74–99)
HCT VFR BLD CALC: 39.9 % (ref 34–48)
HDLC SERPL-MCNC: 49 MG/DL
HEMOGLOBIN: 12.5 G/DL (ref 11.5–15.5)
IMMATURE GRANULOCYTES #: 0.04 E9/L
IMMATURE GRANULOCYTES %: 0.6 % (ref 0–5)
LDL CHOLESTEROL CALCULATED: 98 MG/DL (ref 0–99)
LYMPHOCYTES ABSOLUTE: 2.25 E9/L (ref 1.5–4)
LYMPHOCYTES RELATIVE PERCENT: 33.6 % (ref 20–42)
MCH RBC QN AUTO: 27.5 PG (ref 26–35)
MCHC RBC AUTO-ENTMCNC: 31.3 % (ref 32–34.5)
MCV RBC AUTO: 87.7 FL (ref 80–99.9)
MONOCYTES ABSOLUTE: 0.49 E9/L (ref 0.1–0.95)
MONOCYTES RELATIVE PERCENT: 7.3 % (ref 2–12)
NEUTROPHILS ABSOLUTE: 3.77 E9/L (ref 1.8–7.3)
NEUTROPHILS RELATIVE PERCENT: 56.4 % (ref 43–80)
PDW BLD-RTO: 15.5 FL (ref 11.5–15)
PLATELET # BLD: 237 E9/L (ref 130–450)
PMV BLD AUTO: 11.6 FL (ref 7–12)
POTASSIUM SERPL-SCNC: 4.2 MMOL/L (ref 3.5–5)
RBC # BLD: 4.55 E12/L (ref 3.5–5.5)
SODIUM BLD-SCNC: 139 MMOL/L (ref 132–146)
TOTAL PROTEIN: 7.6 G/DL (ref 6.4–8.3)
TRIGL SERPL-MCNC: 129 MG/DL (ref 0–149)
TSH SERPL DL<=0.05 MIU/L-ACNC: 2.65 UIU/ML (ref 0.27–4.2)
VLDLC SERPL CALC-MCNC: 26 MG/DL
WBC # BLD: 6.7 E9/L (ref 4.5–11.5)

## 2022-11-30 PROCEDURE — 82652 VIT D 1 25-DIHYDROXY: CPT

## 2022-11-30 PROCEDURE — 36415 COLL VENOUS BLD VENIPUNCTURE: CPT

## 2022-11-30 PROCEDURE — 80053 COMPREHEN METABOLIC PANEL: CPT

## 2022-11-30 PROCEDURE — 80061 LIPID PANEL: CPT

## 2022-11-30 PROCEDURE — 84443 ASSAY THYROID STIM HORMONE: CPT

## 2022-11-30 PROCEDURE — 85025 COMPLETE CBC W/AUTO DIFF WBC: CPT

## 2022-12-02 LAB — VITAMIN D 1,25-DIHYDROXY: 103 PG/ML (ref 19.9–79.3)

## (undated) DEVICE — 3M™ IOBAN™ 2 ANTIMICROBIAL INCISE DRAPE 6650EZ: Brand: IOBAN™ 2

## (undated) DEVICE — RESERVOIR BLD COLLCTN C3000ML 30UM FLTR

## (undated) DEVICE — KIT EVAC 400CC DIA1/8IN H PAT 12.5IN 3 SPR RND SHP PVC DRN

## (undated) DEVICE — 5.0MM PRECISION ROUND

## (undated) DEVICE — TOTAL TRAY, DB, 100% SILI FOLEY, 16FR 10: Brand: MEDLINE

## (undated) DEVICE — BAG TRNSF AUTOLGS SUCT AND ANTICOAG LN AUTOLOG

## (undated) DEVICE — HYPODERMIC SAFETY NEEDLE: Brand: MAGELLAN

## (undated) DEVICE — COVER,TABLE,60X90,STERILE: Brand: MEDLINE

## (undated) DEVICE — BRUNNS CURRETTES

## (undated) DEVICE — SPHERE EYE 1 MRK GUIDANCE PASS STEALTHSTATION 1PK/EA

## (undated) DEVICE — GLOVE SURG SZ 85 STD WHT LTX SYN POLYMER BEAD REINF ANTI RL

## (undated) DEVICE — SURGICAL PROCEDURE PACK LAMINECTOMY LUMBAR

## (undated) DEVICE — TOWEL,OR,DSP,ST,BLUE,DLX,10/PK,8PK/CS: Brand: MEDLINE

## (undated) DEVICE — BLADE CLIPPER GEN PURP NS

## (undated) DEVICE — INTENDED FOR TISSUE SEPARATION, AND OTHER PROCEDURES THAT REQUIRE A SHARP SURGICAL BLADE TO PUNCTURE OR CUT.: Brand: BARD-PARKER ® STAINLESS STEEL BLADES

## (undated) DEVICE — 1000 S-DRAPE TOWEL DRAPE 10/BX: Brand: STERI-DRAPE™

## (undated) DEVICE — DRESSING ADH N ADH 8X35 IN 6X175 IN SFT CLTH MEDIPORE +

## (undated) DEVICE — APPLICATOR PREP 26ML 0.7% IOD POVACRYLEX 74% ISO ALC ST

## (undated) DEVICE — DRILL SYSTEM 7

## (undated) DEVICE — SYRINGE MED 20ML STD CLR PLAS LUERLOCK TIP N CTRL DISP

## (undated) DEVICE — GOWN,SIRUS,FABRNF,L,20/CS: Brand: MEDLINE

## (undated) DEVICE — TUBING SUCT 12FR MAL ALUM SHFT FN CAP VENT UNIV CONN W/ OBT

## (undated) DEVICE — MARKER,SKIN,WI/RULER AND LABELS: Brand: MEDLINE

## (undated) DEVICE — SKIN AFFIX SURG ADHESIVE 72/CS 0.55ML: Brand: MEDLINE

## (undated) DEVICE — Device

## (undated) DEVICE — Z CONVERTED USE 2275207 CLOTH PREP W7.5XL7.5IN 2% CHG SKIN ALC AND RNS FREE

## (undated) DEVICE — 1.5L THIN WALL CAN: Brand: CRD

## (undated) DEVICE — EXTRAS UGOKWE

## (undated) DEVICE — SHEET, T, LAPAROTOMY, STERILE: Brand: MEDLINE

## (undated) DEVICE — DRAPE C ARM UNIV W41XL74IN CLR PLAS XR VELC CLSR POLY STRP

## (undated) DEVICE — DOUBLE BASIN SET: Brand: MEDLINE INDUSTRIES, INC.

## (undated) DEVICE — LOCATOR RAD PASS SPHR MRK NAVIGATION BALL DISP STLTH STN

## (undated) DEVICE — GOWN,SIRUS,FABRNF,XL,20/CS: Brand: MEDLINE

## (undated) DEVICE — KIT PLT RATIO DISPNS KT 2IN CANN TIP SPRY TIP DISP MAGELLAN

## (undated) DEVICE — PATIENT RETURN ELECTRODE, SINGLE-USE, CONTACT QUALITY MONITORING, ADULT, WITH 9FT CORD, FOR PATIENTS WEIGING OVER 33LBS. (15KG): Brand: MEGADYNE

## (undated) DEVICE — NEEDLE SPNL L3.5IN PNK HUB S STL REG WALL FIT STYL W/ QNCKE

## (undated) DEVICE — SOLUTION IV IRRIG POUR BRL 0.9% SODIUM CHL 2F7124

## (undated) DEVICE — GLOVE ORANGE PI 8   MSG9080

## (undated) DEVICE — PAD GEN USE BORDERED ADH 14IN 2IN AND 12IN 4IN GZ UNIV ST

## (undated) DEVICE — CODMAN® SURGICAL PATTIES 1/2" X 1" (1.27CM X 2.54CM): Brand: CODMAN®

## (undated) DEVICE — GRADUATE

## (undated) DEVICE — GLOVE SURG SZ 65 THK91MIL LTX FREE SYN POLYISOPRENE

## (undated) DEVICE — SET SPINAL NEURO STNEU1

## (undated) DEVICE — SOLUTION IV IRRIG WATER 1000ML POUR BRL 2F7114

## (undated) DEVICE — LABEL MED 4 IN SURG PANEL W/ PEN STRL

## (undated) DEVICE — KIT POS W/ FOAM ARM CRADL SHEARGUARD CHST PD CVR FOR SPNL

## (undated) DEVICE — GAUZE,SPONGE,4"X4",16PLY,XRAY,STRL,LF: Brand: MEDLINE

## (undated) DEVICE — E-Z CLEAN, NON-STICK, PTFE COATED, ELECTROSURGICAL BLADE ELECTRODE, 6.5 INCH (16.5 CM): Brand: MEGADYNE

## (undated) DEVICE — SHEET,DRAPE,40X58,STERILE: Brand: MEDLINE

## (undated) DEVICE — READY WET SKIN SCRUB TRAY-LF: Brand: MEDLINE INDUSTRIES, INC.

## (undated) DEVICE — GLOVE ORANGE PI 7   MSG9070

## (undated) DEVICE — THE MILL DISPOSABLE - MEDIUM